# Patient Record
Sex: FEMALE | Race: WHITE | NOT HISPANIC OR LATINO
[De-identification: names, ages, dates, MRNs, and addresses within clinical notes are randomized per-mention and may not be internally consistent; named-entity substitution may affect disease eponyms.]

---

## 2022-03-23 ENCOUNTER — TRANSCRIPTION ENCOUNTER (OUTPATIENT)
Age: 57
End: 2022-03-23

## 2022-06-17 ENCOUNTER — INPATIENT (INPATIENT)
Facility: HOSPITAL | Age: 57
LOS: 3 days | Discharge: HOME | End: 2022-06-21
Attending: INTERNAL MEDICINE | Admitting: INTERNAL MEDICINE
Payer: COMMERCIAL

## 2022-06-17 VITALS
WEIGHT: 250 LBS | HEIGHT: 68 IN | RESPIRATION RATE: 20 BRPM | DIASTOLIC BLOOD PRESSURE: 97 MMHG | SYSTOLIC BLOOD PRESSURE: 147 MMHG | HEART RATE: 108 BPM | OXYGEN SATURATION: 95 %

## 2022-06-17 LAB
ALBUMIN SERPL ELPH-MCNC: 4.5 G/DL — SIGNIFICANT CHANGE UP (ref 3.5–5.2)
ALP SERPL-CCNC: 87 U/L — SIGNIFICANT CHANGE UP (ref 30–115)
ALT FLD-CCNC: 22 U/L — SIGNIFICANT CHANGE UP (ref 0–41)
ANION GAP SERPL CALC-SCNC: 20 MMOL/L — HIGH (ref 7–14)
AST SERPL-CCNC: 28 U/L — SIGNIFICANT CHANGE UP (ref 0–41)
BASOPHILS # BLD AUTO: 0.04 K/UL — SIGNIFICANT CHANGE UP (ref 0–0.2)
BASOPHILS NFR BLD AUTO: 0.3 % — SIGNIFICANT CHANGE UP (ref 0–1)
BILIRUB SERPL-MCNC: 1.3 MG/DL — HIGH (ref 0.2–1.2)
BUN SERPL-MCNC: 10 MG/DL — SIGNIFICANT CHANGE UP (ref 10–20)
CALCIUM SERPL-MCNC: 10 MG/DL — SIGNIFICANT CHANGE UP (ref 8.5–10.1)
CHLORIDE SERPL-SCNC: 96 MMOL/L — LOW (ref 98–110)
CO2 SERPL-SCNC: 18 MMOL/L — SIGNIFICANT CHANGE UP (ref 17–32)
CREAT SERPL-MCNC: 0.8 MG/DL — SIGNIFICANT CHANGE UP (ref 0.7–1.5)
EGFR: 86 ML/MIN/1.73M2 — SIGNIFICANT CHANGE UP
EOSINOPHIL # BLD AUTO: 0.01 K/UL — SIGNIFICANT CHANGE UP (ref 0–0.7)
EOSINOPHIL NFR BLD AUTO: 0.1 % — SIGNIFICANT CHANGE UP (ref 0–8)
GLUCOSE SERPL-MCNC: 147 MG/DL — HIGH (ref 70–99)
HCG SERPL QL: NEGATIVE — SIGNIFICANT CHANGE UP
HCT VFR BLD CALC: 40.4 % — SIGNIFICANT CHANGE UP (ref 37–47)
HGB BLD-MCNC: 13.6 G/DL — SIGNIFICANT CHANGE UP (ref 12–16)
IMM GRANULOCYTES NFR BLD AUTO: 0.7 % — HIGH (ref 0.1–0.3)
LYMPHOCYTES # BLD AUTO: 0.84 K/UL — LOW (ref 1.2–3.4)
LYMPHOCYTES # BLD AUTO: 6.9 % — LOW (ref 20.5–51.1)
MAGNESIUM SERPL-MCNC: 1.3 MG/DL — LOW (ref 1.8–2.4)
MCHC RBC-ENTMCNC: 33.3 PG — HIGH (ref 27–31)
MCHC RBC-ENTMCNC: 33.7 G/DL — SIGNIFICANT CHANGE UP (ref 32–37)
MCV RBC AUTO: 99 FL — SIGNIFICANT CHANGE UP (ref 81–99)
MONOCYTES # BLD AUTO: 0.5 K/UL — SIGNIFICANT CHANGE UP (ref 0.1–0.6)
MONOCYTES NFR BLD AUTO: 4.1 % — SIGNIFICANT CHANGE UP (ref 1.7–9.3)
NEUTROPHILS # BLD AUTO: 10.75 K/UL — HIGH (ref 1.4–6.5)
NEUTROPHILS NFR BLD AUTO: 87.9 % — HIGH (ref 42.2–75.2)
NRBC # BLD: 0 /100 WBCS — SIGNIFICANT CHANGE UP (ref 0–0)
NT-PROBNP SERPL-SCNC: 2340 PG/ML — HIGH (ref 0–300)
PLATELET # BLD AUTO: 261 K/UL — SIGNIFICANT CHANGE UP (ref 130–400)
POTASSIUM SERPL-MCNC: 5 MMOL/L — SIGNIFICANT CHANGE UP (ref 3.5–5)
POTASSIUM SERPL-SCNC: 5 MMOL/L — SIGNIFICANT CHANGE UP (ref 3.5–5)
PROT SERPL-MCNC: 7.2 G/DL — SIGNIFICANT CHANGE UP (ref 6–8)
RBC # BLD: 4.08 M/UL — LOW (ref 4.2–5.4)
RBC # FLD: 14.4 % — SIGNIFICANT CHANGE UP (ref 11.5–14.5)
SARS-COV-2 RNA SPEC QL NAA+PROBE: SIGNIFICANT CHANGE UP
SODIUM SERPL-SCNC: 134 MMOL/L — LOW (ref 135–146)
TROPONIN T SERPL-MCNC: 0.01 NG/ML — SIGNIFICANT CHANGE UP
TROPONIN T SERPL-MCNC: 0.02 NG/ML — HIGH
WBC # BLD: 12.22 K/UL — HIGH (ref 4.8–10.8)
WBC # FLD AUTO: 12.22 K/UL — HIGH (ref 4.8–10.8)

## 2022-06-17 PROCEDURE — 99223 1ST HOSP IP/OBS HIGH 75: CPT

## 2022-06-17 PROCEDURE — 93010 ELECTROCARDIOGRAM REPORT: CPT | Mod: 77

## 2022-06-17 PROCEDURE — 99285 EMERGENCY DEPT VISIT HI MDM: CPT

## 2022-06-17 PROCEDURE — 71046 X-RAY EXAM CHEST 2 VIEWS: CPT | Mod: 26

## 2022-06-17 PROCEDURE — 93010 ELECTROCARDIOGRAM REPORT: CPT

## 2022-06-17 RX ORDER — FUROSEMIDE 40 MG
40 TABLET ORAL
Refills: 0 | Status: DISCONTINUED | OUTPATIENT
Start: 2022-06-17 | End: 2022-06-18

## 2022-06-17 RX ORDER — METOPROLOL TARTRATE 50 MG
5 TABLET ORAL ONCE
Refills: 0 | Status: COMPLETED | OUTPATIENT
Start: 2022-06-17 | End: 2022-06-17

## 2022-06-17 RX ORDER — MAGNESIUM OXIDE 400 MG ORAL TABLET 241.3 MG
400 TABLET ORAL
Refills: 0 | Status: DISCONTINUED | OUTPATIENT
Start: 2022-06-17 | End: 2022-06-21

## 2022-06-17 RX ORDER — FUROSEMIDE 40 MG
40 TABLET ORAL ONCE
Refills: 0 | Status: COMPLETED | OUTPATIENT
Start: 2022-06-17 | End: 2022-06-17

## 2022-06-17 RX ORDER — MAGNESIUM SULFATE 500 MG/ML
2 VIAL (ML) INJECTION ONCE
Refills: 0 | Status: COMPLETED | OUTPATIENT
Start: 2022-06-17 | End: 2022-06-17

## 2022-06-17 RX ORDER — METOPROLOL TARTRATE 50 MG
25 TABLET ORAL
Refills: 0 | Status: DISCONTINUED | OUTPATIENT
Start: 2022-06-17 | End: 2022-06-18

## 2022-06-17 RX ORDER — ENOXAPARIN SODIUM 100 MG/ML
40 INJECTION SUBCUTANEOUS EVERY 24 HOURS
Refills: 0 | Status: DISCONTINUED | OUTPATIENT
Start: 2022-06-17 | End: 2022-06-18

## 2022-06-17 RX ORDER — DILTIAZEM HCL 120 MG
10 CAPSULE, EXT RELEASE 24 HR ORAL ONCE
Refills: 0 | Status: COMPLETED | OUTPATIENT
Start: 2022-06-17 | End: 2022-06-17

## 2022-06-17 RX ORDER — ALBUTEROL 90 UG/1
2 AEROSOL, METERED ORAL EVERY 6 HOURS
Refills: 0 | Status: DISCONTINUED | OUTPATIENT
Start: 2022-06-17 | End: 2022-06-21

## 2022-06-17 RX ORDER — ASPIRIN/CALCIUM CARB/MAGNESIUM 324 MG
162 TABLET ORAL ONCE
Refills: 0 | Status: COMPLETED | OUTPATIENT
Start: 2022-06-17 | End: 2022-06-17

## 2022-06-17 RX ORDER — DILTIAZEM HCL 120 MG
10 CAPSULE, EXT RELEASE 24 HR ORAL
Qty: 125 | Refills: 0 | Status: DISCONTINUED | OUTPATIENT
Start: 2022-06-17 | End: 2022-06-18

## 2022-06-17 RX ADMIN — Medication 10 MILLIGRAM(S): at 23:33

## 2022-06-17 RX ADMIN — Medication 25 GRAM(S): at 13:11

## 2022-06-17 RX ADMIN — Medication 25 MILLIGRAM(S): at 18:34

## 2022-06-17 RX ADMIN — MAGNESIUM OXIDE 400 MG ORAL TABLET 400 MILLIGRAM(S): 241.3 TABLET ORAL at 21:31

## 2022-06-17 RX ADMIN — Medication 40 MILLIGRAM(S): at 11:56

## 2022-06-17 RX ADMIN — Medication 5 MILLIGRAM(S): at 23:16

## 2022-06-17 RX ADMIN — Medication 40 MILLIGRAM(S): at 20:24

## 2022-06-17 RX ADMIN — Medication 162 MILLIGRAM(S): at 11:56

## 2022-06-17 RX ADMIN — Medication 5 MILLIGRAM(S): at 23:33

## 2022-06-17 NOTE — ED ADULT NURSE NOTE - CHIEF COMPLAINT QUOTE
Pt here with sob, chest pressure and BLLE swelling since arrival on Sunday from [facility]. ekg done.

## 2022-06-17 NOTE — ED PROVIDER NOTE - PHYSICAL EXAMINATION
VITAL SIGNS: I have reviewed nursing notes and confirm.  CONSTITUTIONAL: Well-developed; well-nourished; in no acute distress.   SKIN: skin exam is warm and dry, no acute rash.    HEAD: Normocephalic; atraumatic.  EYES: conjunctiva and sclera clear.  ENT: No nasal discharge; airway clear.  NECK: Supple; non tender.  CARD: S1, S2 normal; no murmurs, gallops, or rubs. Regular rate and rhythm.   RESP: No wheezes, rales or rhonchi.  ABD: Normal bowel sounds; soft; non-distended; non-tender  EXT: pitting edema lower extremity, Normal ROM.  No clubbing, cyanosis .   LYMPH: No acute cervical adenopathy.  NEURO: Alert, oriented, grossly unremarkable  PSYCH: Cooperative, appropriate.

## 2022-06-17 NOTE — H&P ADULT - ATTENDING COMMENTS
Patient seen and examined on 06/17    HPI:  55 y/o woman with a past medical history of paroxysmal atrial fibrillation (not on AC) admitted for fluid overload. She notes progressive SOB/BASSETT, orthopnea, and LE swelling, associated with palpitations for the last week. Prior to this she was on a cruise and reports eating poorly/salty foods. She denied any chest pain, abdo pain, n/v/d, LE pain/erythema.   She reports a remote history of pA-Fib for which she takes metoprolol and ASA but not on anticoagulation. Says she hasn't followed with Divuuri in about 7 years because she felt fine afterwards  OF NOTE: patient was noted to be in a-fib w/RVR on Tele, 135 - 160. She reports palpitations but otherwise breathing well and no chest pain.   Metoprolol 5 x 2 IV given without improvement. Cardizem 10mg IV given with rate improvement to ~105-128 and alleviation of the palpitations.  She was given lasix earlier and reported significant improvement in chest pain, dyspnea and LE edema       REVIEW OF SYSTEMS:  CONSTITUTIONAL:  No weakness, fevers, chills, night sweats, weight loss  EYES/ENT: No visual changes. No vertigo or dysphagia  NECK: No neck pain or stiffness  RESPIRATORY: No cough, wheezing, hemoptysis. + shortness of breath  CARDIOVASCULAR: No chest pain, + palpitations. +lower extremity edema, orthopnea   GASTROINTESTINAL: No abdominal pain. No nausea, vomiting, diarrhea, or hematemesis  GENITOURINARY: No dysuria or hematuria   NEUROLOGICAL: No focal numbness or weakness  SKIN: No rashes or itching  HEMATOLOGIC: No easy bruising or prolonged bleeding.      PHYSICAL EXAM:  GENERAL: NAD, obese, Non-toxic, stated age   HEAD:  Atraumatic, Normocephalic  EYES: EOMI, Sclera White   NECK: Supple, No JVD  CHEST/LUNG: mild crackles bilaterally; No wheezing, rhonchi. Breathing and speaking comfortably on RA   HEART: tachycardic, irregular rate and rhythm; s1, s2, No murmurs, rubs, or gallops  ABDOMEN: Soft, Nontender, Nondistended; Bowel sounds present, No rebound or guarding noted   EXTREMITIES:  trace pitting lower extremity edema. No calf tenderness to palpation.  No clubbing or cyanosis  PSYCH: AAOx3, pleasant, cooperative, not anxious  NEUROLOGY: 5/5 strength in all extremities, no downward drift. Sensation grossly intact.   SKIN: No rashes or lesions      ASSESSMENT AND PLAN:  Fluid overload / Suspected acute heart failure with unknown ejection fraction   Heart failure with reduced ejection fraction exacerbation:   -Cont with IV lasix 40mg qD  -Cont tele monitoring, trend cardiac enzymes, check 2D Echo.   -Cont beta blocker. Add ACE-I if BP tolerates, currently on cardizem as well for rate control   -Strict I/O, daily weights, and monitor electrolytes carefully while aggressively diuresing.   -Cardio eval with Dr Nascimento / Reva      -Saw Divuuri 7 years ago, wanted a new cardiologist     Paroxysmal atrial fibrillation with RVR  Elevated trops --> down trended   Hypomagnesemia --> replete and repeat in the morning   -Metorpolol IV 5 x 2 without improvement  -Responded but not fully controlled with cardizem IV 10, cardizem drip started for now     -Improved symptoms, HR still ~115    -My need further antiarrhythmics if Euvolemic, mag normal, and rate still uncontrolled   -Cont with metoprolol for now, can increase once volume status improved   -CHADVASC 2-3 (assuming CHF for now, and pressures slightly elevated) --> start therapeutic lovenox for now  -Cardio eval   -EPS eval if requested by cardio     Hyperglycemia: check Hba1c     Obesity: Patient should follow with an out patient dietician. Bariatric Surgery should be discussed with her PMD.     DVT ppx: therapeutic lovenox  GI ppx: Not indicated  GOC: Full code.    My note supersedes the residents in the event of a discrepancy.

## 2022-06-17 NOTE — ED PROVIDER NOTE - OBJECTIVE STATEMENT
Patient is a 56-year-old female with past medical history of tachycardia on metoprolol here for evaluation of dyspnea on exertion x2 weeks has been progressively worsening with associated midsternal chest discomfort and symmetrical lower extremity edema.  Patient also notes that she feels short of breath when lying flat.  Patient denies fever, chills, cough, abdominal pain.  Patient's last cardiac work-up was approximately 7 years ago.

## 2022-06-17 NOTE — H&P ADULT - NSHPPHYSICALEXAM_GEN_ALL_CORE
(This exam performed after pt responded well to diuresis in the ED):    CONSTITUTIONAL: No acute distress, well-developed, well-groomed, AAOx3  HEAD: Atraumatic, normocephalic  EYES: EOM intact, PERRLA, conjunctiva and sclera clear  ENT: Supple, no masses, no thyromegaly, no bruits, no JVD; moist mucous membranes  PULMONARY: crackles at lung bases b/l, symmetric chest rise, no tenderness on palpation  CARDIOVASCULAR: Regular rate and rhythm; no murmurs, rubs, or gallops  GASTROINTESTINAL: Soft, non-tender, non-distended; bowel sounds present  MUSCULOSKELETAL: 2+ peripheral pulses; no cyanosis. +b/l lower extremity edema to distal knees  SKIN: No rashes or lesions; warm and dry

## 2022-06-17 NOTE — H&P ADULT - ASSESSMENT
#CHF exacerbation, acute, ejection fraction unknown  - s/p diuresis in ED with improvement in dyspnea  - c/w IV diuresis for now  - med-tele monitoring, strict ins/outs, 1.5L fluid restriction  - f/u TTE echo        #Mild troponemia with atypical chest pain (resolved  - EKG no acute ischemic changes  - f/u 3rd trop at 4pm    #b/l lower extremity edema  - etiology most likely CHF exasp but given recent international travel, obesity cannot rule out DVT  - LE duplex ordered--> f/u  - prophylactic Lovenox for now    #Obesity  - weight loss and exercise counselling  - DASH/ CC diet    #Code status: full code    note patient will need strict medication adherence counselling and o/p cardiology follow up to prevent readmission, she also does not have a PMD #CHF exacerbation, acute, ejection fraction unknown  - s/p diuresis in ED with improvement in dyspnea  - c/w IV diuresis for now  - med-tele monitoring, strict ins/outs, 1.5L fluid restriction  - f/u TTE echo      #Mild troponemia with atypical chest pain (resolved  - EKG no acute ischemic changes  - f/u 3rd trop at 4pm      #b/l lower extremity edema  - etiology most likely CHF exasp but given recent international travel, obesity cannot rule out DVT  - LE duplex ordered--> f/u  - prophylactic Lovenox for now    #HTN  - c/w home metoprolol ER 25mg    #COPD  - not on home O2 or maintenance meds  -no wheezing on exam  - c/w albuterol prn    #Obesity  - weight loss and exercise counselling  - DASH/ CC diet      #Code status: full code    note patient will need strict medication adherence counselling and o/p cardiology follow up to prevent readmission, PMD is Dr Zavala on [facility] #CHF exacerbation, acute, ejection fraction unknown  - s/p diuresis in ED with improvement in dyspnea  - c/w IV diuresis for now  - med-tele monitoring, strict ins/outs, 1.5L fluid restriction  - f/u TTE echo      #Mild troponemia with atypical chest pain (resolved  - EKG no acute ischemic changes  - f/u 3rd trop at 4pm      #b/l lower extremity edema  - etiology most likely CHF exasp but given recent international travel, obesity cannot rule out DVT  - LE duplex ordered--> f/u  - prophylactic Lovenox for now    #HTN  - c/w home metoprolol ER 25mg    #COPD  - not on home O2 or maintenance meds  -no wheezing on exam  - c/w albuterol prn    #Obesity  - weight loss and exercise counselling  - DASH/ CC diet    #Hypomagnesemia on admission  - Repleted in ED  - f/u am level    #Code status: full code    note patient will need strict medication adherence counselling and o/p cardiology follow up to prevent readmission, PMD is Dr Zavala on [facility] #CHF exacerbation, acute, ejection fraction unknown  - s/p diuresis in ED with improvement in dyspnea  - c/w IV diuresis for now  - med-tele monitoring, strict ins/outs, 1.5L fluid restriction  - f/u TTE echo      #Mild troponemia with atypical chest pain (resolved  - EKG no acute ischemic changes  - f/u 3rd trop at 4pm      #b/l lower extremity edema  - etiology most likely CHF exasp but given recent international travel, obesity cannot rule out DVT  - LE duplex ordered--> f/u  - prophylactic Lovenox for now    #HTN  - c/w home metoprolol ER 25mg    #Obesity  - weight loss and exercise counselling  - DASH/ CC diet    #Hypomagnesemia on admission  - Repleted in ED  - f/u am level    #Code status: full code    note patient will need strict medication adherence counselling and o/p cardiology follow up to prevent readmission, PMD is Dr Zavala on [facility]

## 2022-06-17 NOTE — ED PROVIDER NOTE - CLINICAL SUMMARY MEDICAL DECISION MAKING FREE TEXT BOX
56-year-old female presenting with shortness of breath dyspnea on exertion orthopnea bilateral lower extremity swelling, intermittent for several weeks, acutely worse in the past week.  States that she has not seen cardiology in over 5 years.  Last test 7 years ago with Dr. Rodriguez.  Previously had associated chest discomfort, now resolved.  Well appearing, NAD, non toxic. NCAT PERRLA EOMI neck supple non tender normal wob diffuse crackles, diminished breath sounds bilateral bases rrr abdomen s nt nd no rebound no guarding [id number] neuro non focal 2+ pitting edema to bilateral lower extremities.  Labs EKG imaging reviewed.  Patient diuresed.  Will admit for further evaluation.

## 2022-06-17 NOTE — H&P ADULT - NSHPLABSRESULTS_GEN_ALL_CORE
SUBJECTIVE:    Patient is a 56y old Female who presents with a chief complaint of     HPI:      Currently admitted to medicine with the primary diagnosis of CHF exacerbation         Besides the pertinent positives and negatives described above, the ROS was within normal limits.    PAST MEDICAL & SURGICAL HISTORY  No pertinent past medical history    No significant past surgical history      SOCIAL HISTORY:    ALLERGIES:  No Known Allergies    MEDICATIONS:  STANDING MEDICATIONS    PRN MEDICATIONS    VITALS:   T(F): --  HR: 88  BP: 138/78  RR: 18  SpO2: 98%    LABS:                        13.6   12.22 )-----------( 261      ( 17 Jun 2022 10:19 )             40.4     06-17    134<L>  |  96<L>  |  10  ----------------------------<  147<H>  5.0   |  18  |  0.8    Ca    10.0      17 Jun 2022 10:19  Mg     1.3     06-17    TPro  7.2  /  Alb  4.5  /  TBili  1.3<H>  /  DBili  x   /  AST  28  /  ALT  22  /  AlkPhos  87  06-17          Troponin T, Serum: 0.02 ng/mL *H* (06-17-22 @ 10:19)      CARDIAC MARKERS ( 17 Jun 2022 10:19 )  x     / 0.02 ng/mL / x     / x     / x          RADIOLOGY:< from: Xray Chest 2 Views PA/Lat (06.17.22 @ 10:24) >      INTERPRETATION:  Clinical History / Reason for exam: Chest pain.    Comparison : Chest radiograph July 15, 2013.    Technique/Positioning: Frontaland lateral.    Impression:    Bilateral opacifications. Cardiomegaly    < from: 12 Lead ECG (06.17.22 @ 09:30) >      Diagnosis Line Sinus tachycardia with 1st degree A-V block with occasional Premature  ventricular complexes  Rightward axis  Low voltage QRS  Cannot rule out Anteroseptal infarct , age undetermined  Abnormal ECG      < end of copied text >

## 2022-06-17 NOTE — ED ADULT TRIAGE NOTE - CHIEF COMPLAINT QUOTE
Pt here with sob, chest pressure and BLLE swelling since arrival on Sunday from [facility]. ekg done.

## 2022-06-17 NOTE — ED ADULT NURSE NOTE - OBJECTIVE STATEMENT
Pt presented to ED c/o chest pain. Pt c/o chest pain, SOB on exertion, and b/l LE edema. Denies n/v/d/fevers/chills. (+)pulses noted. Steady gait noted. Pt A&Ox4, ambulatory baseline. Pt placed on Tele/O2 monitor.

## 2022-06-17 NOTE — H&P ADULT - HISTORY OF PRESENT ILLNESS
56-year-old female no past medical history (does not follow with any doctors) presenting with shortness of breath that has been gradually worsening over the last few weeks, two the point where she now becomes out of breath after taking only a few steps. Also endorses frequent nighttime awakenings with need to prop her head up on pillows during this time frame. Also endorses lower extremity swelling. She is not aware of any heart conditions but admits she once saw a cardiologist. Dr Kingston 7 years ago. This morning she also noticed mild vague chest pain, sharp and intermittent, so decided to come to the ED but pain resolved by the time she presented and did not reoccur.     In the ED pt was well appearing,saturating well on room air non-labored breathing at rest but CXR showed diffuse b/l infiltrates to mid/upper lung BNP 2.3K Trops mildly elevated x2 but no ischemic changes on EKG. PT was diuresed in ED and reported good symptom relief thereafter, will be admitted to [facility] for continued treatment and echocardiogram workup of acute heart failure exacerbation

## 2022-06-17 NOTE — ED PROVIDER NOTE - NS ED ROS FT
Eyes:  No visual changes, eye pain or discharge.  ENMT:  No hearing changes, pain, discharge or infections. No neck pain or stiffness.  Cardiac:  + chest pain, SOB and edema. + chest pain with exertion.  Respiratory:  No cough or respiratory distress. No hemoptysis. No history of asthma or RAD.  GI:  No nausea, vomiting, diarrhea or abdominal pain.  :  No dysuria, frequency or burning.  MS:  No myalgia, muscle weakness, joint pain or back pain.  Neuro:  No headache or weakness.  No LOC.  Skin:  No skin rash.   Endocrine: No history of thyroid disease or diabetes.  Except as documented in the HPI,  all other systems are negative.

## 2022-06-17 NOTE — ED PROVIDER NOTE - NS ED ATTENDING STATEMENT MOD
This was a shared visit with the KATARZYNA. I reviewed and verified the documentation and independently performed the documented:

## 2022-06-18 LAB
ALBUMIN SERPL ELPH-MCNC: 4.3 G/DL — SIGNIFICANT CHANGE UP (ref 3.5–5.2)
ALBUMIN SERPL ELPH-MCNC: 4.5 G/DL — SIGNIFICANT CHANGE UP (ref 3.5–5.2)
ALP SERPL-CCNC: 70 U/L — SIGNIFICANT CHANGE UP (ref 30–115)
ALP SERPL-CCNC: 77 U/L — SIGNIFICANT CHANGE UP (ref 30–115)
ALT FLD-CCNC: 22 U/L — SIGNIFICANT CHANGE UP (ref 0–41)
ALT FLD-CCNC: 23 U/L — SIGNIFICANT CHANGE UP (ref 0–41)
ANION GAP SERPL CALC-SCNC: 16 MMOL/L — HIGH (ref 7–14)
ANION GAP SERPL CALC-SCNC: 18 MMOL/L — HIGH (ref 7–14)
AST SERPL-CCNC: 30 U/L — SIGNIFICANT CHANGE UP (ref 0–41)
AST SERPL-CCNC: 33 U/L — SIGNIFICANT CHANGE UP (ref 0–41)
BASOPHILS # BLD AUTO: 0.06 K/UL — SIGNIFICANT CHANGE UP (ref 0–0.2)
BASOPHILS NFR BLD AUTO: 0.8 % — SIGNIFICANT CHANGE UP (ref 0–1)
BILIRUB SERPL-MCNC: 1.2 MG/DL — SIGNIFICANT CHANGE UP (ref 0.2–1.2)
BILIRUB SERPL-MCNC: 1.2 MG/DL — SIGNIFICANT CHANGE UP (ref 0.2–1.2)
BUN SERPL-MCNC: 12 MG/DL — SIGNIFICANT CHANGE UP (ref 10–20)
BUN SERPL-MCNC: 13 MG/DL — SIGNIFICANT CHANGE UP (ref 10–20)
CALCIUM SERPL-MCNC: 9.4 MG/DL — SIGNIFICANT CHANGE UP (ref 8.5–10.1)
CALCIUM SERPL-MCNC: 9.5 MG/DL — SIGNIFICANT CHANGE UP (ref 8.5–10.1)
CHLORIDE SERPL-SCNC: 94 MMOL/L — LOW (ref 98–110)
CHLORIDE SERPL-SCNC: 95 MMOL/L — LOW (ref 98–110)
CHOLEST SERPL-MCNC: 201 MG/DL — HIGH
CO2 SERPL-SCNC: 23 MMOL/L — SIGNIFICANT CHANGE UP (ref 17–32)
CO2 SERPL-SCNC: 27 MMOL/L — SIGNIFICANT CHANGE UP (ref 17–32)
CREAT SERPL-MCNC: 0.8 MG/DL — SIGNIFICANT CHANGE UP (ref 0.7–1.5)
CREAT SERPL-MCNC: 1 MG/DL — SIGNIFICANT CHANGE UP (ref 0.7–1.5)
EGFR: 66 ML/MIN/1.73M2 — SIGNIFICANT CHANGE UP
EGFR: 86 ML/MIN/1.73M2 — SIGNIFICANT CHANGE UP
EOSINOPHIL # BLD AUTO: 0.06 K/UL — SIGNIFICANT CHANGE UP (ref 0–0.7)
EOSINOPHIL NFR BLD AUTO: 0.8 % — SIGNIFICANT CHANGE UP (ref 0–8)
GLUCOSE SERPL-MCNC: 148 MG/DL — HIGH (ref 70–99)
GLUCOSE SERPL-MCNC: 177 MG/DL — HIGH (ref 70–99)
HCT VFR BLD CALC: 40.1 % — SIGNIFICANT CHANGE UP (ref 37–47)
HDLC SERPL-MCNC: 38 MG/DL — LOW
HGB BLD-MCNC: 13.3 G/DL — SIGNIFICANT CHANGE UP (ref 12–16)
IMM GRANULOCYTES NFR BLD AUTO: 0.5 % — HIGH (ref 0.1–0.3)
LIPID PNL WITH DIRECT LDL SERPL: 141 MG/DL — HIGH
LYMPHOCYTES # BLD AUTO: 1.24 K/UL — SIGNIFICANT CHANGE UP (ref 1.2–3.4)
LYMPHOCYTES # BLD AUTO: 15.6 % — LOW (ref 20.5–51.1)
MAGNESIUM SERPL-MCNC: 1.4 MG/DL — LOW (ref 1.8–2.4)
MAGNESIUM SERPL-MCNC: 1.7 MG/DL — LOW (ref 1.8–2.4)
MCHC RBC-ENTMCNC: 33.2 G/DL — SIGNIFICANT CHANGE UP (ref 32–37)
MCHC RBC-ENTMCNC: 33.3 PG — HIGH (ref 27–31)
MCV RBC AUTO: 100.5 FL — HIGH (ref 81–99)
MONOCYTES # BLD AUTO: 0.49 K/UL — SIGNIFICANT CHANGE UP (ref 0.1–0.6)
MONOCYTES NFR BLD AUTO: 6.2 % — SIGNIFICANT CHANGE UP (ref 1.7–9.3)
NEUTROPHILS # BLD AUTO: 6.06 K/UL — SIGNIFICANT CHANGE UP (ref 1.4–6.5)
NEUTROPHILS NFR BLD AUTO: 76.1 % — HIGH (ref 42.2–75.2)
NON HDL CHOLESTEROL: 163 MG/DL — HIGH
NRBC # BLD: 0 /100 WBCS — SIGNIFICANT CHANGE UP (ref 0–0)
PLATELET # BLD AUTO: 194 K/UL — SIGNIFICANT CHANGE UP (ref 130–400)
POTASSIUM SERPL-MCNC: 3.6 MMOL/L — SIGNIFICANT CHANGE UP (ref 3.5–5)
POTASSIUM SERPL-MCNC: 3.9 MMOL/L — SIGNIFICANT CHANGE UP (ref 3.5–5)
POTASSIUM SERPL-SCNC: 3.6 MMOL/L — SIGNIFICANT CHANGE UP (ref 3.5–5)
POTASSIUM SERPL-SCNC: 3.9 MMOL/L — SIGNIFICANT CHANGE UP (ref 3.5–5)
PROT SERPL-MCNC: 6.6 G/DL — SIGNIFICANT CHANGE UP (ref 6–8)
PROT SERPL-MCNC: 6.8 G/DL — SIGNIFICANT CHANGE UP (ref 6–8)
RBC # BLD: 3.99 M/UL — LOW (ref 4.2–5.4)
RBC # FLD: 14.8 % — HIGH (ref 11.5–14.5)
SODIUM SERPL-SCNC: 135 MMOL/L — SIGNIFICANT CHANGE UP (ref 135–146)
SODIUM SERPL-SCNC: 138 MMOL/L — SIGNIFICANT CHANGE UP (ref 135–146)
TRIGL SERPL-MCNC: 115 MG/DL — SIGNIFICANT CHANGE UP
TROPONIN T SERPL-MCNC: <0.01 NG/ML — SIGNIFICANT CHANGE UP
WBC # BLD: 7.95 K/UL — SIGNIFICANT CHANGE UP (ref 4.8–10.8)
WBC # FLD AUTO: 7.95 K/UL — SIGNIFICANT CHANGE UP (ref 4.8–10.8)

## 2022-06-18 PROCEDURE — 93010 ELECTROCARDIOGRAM REPORT: CPT

## 2022-06-18 PROCEDURE — 99233 SBSQ HOSP IP/OBS HIGH 50: CPT

## 2022-06-18 RX ORDER — METOPROLOL TARTRATE 50 MG
25 TABLET ORAL
Refills: 0 | Status: DISCONTINUED | OUTPATIENT
Start: 2022-06-18 | End: 2022-06-20

## 2022-06-18 RX ORDER — ENOXAPARIN SODIUM 100 MG/ML
115 INJECTION SUBCUTANEOUS EVERY 12 HOURS
Refills: 0 | Status: DISCONTINUED | OUTPATIENT
Start: 2022-06-18 | End: 2022-06-21

## 2022-06-18 RX ORDER — POTASSIUM CHLORIDE 20 MEQ
40 PACKET (EA) ORAL ONCE
Refills: 0 | Status: COMPLETED | OUTPATIENT
Start: 2022-06-18 | End: 2022-06-18

## 2022-06-18 RX ORDER — MAGNESIUM SULFATE 500 MG/ML
2 VIAL (ML) INJECTION ONCE
Refills: 0 | Status: COMPLETED | OUTPATIENT
Start: 2022-06-18 | End: 2022-06-18

## 2022-06-18 RX ORDER — DILTIAZEM HCL 120 MG
10 CAPSULE, EXT RELEASE 24 HR ORAL
Qty: 125 | Refills: 0 | Status: DISCONTINUED | OUTPATIENT
Start: 2022-06-18 | End: 2022-06-18

## 2022-06-18 RX ORDER — FUROSEMIDE 40 MG
40 TABLET ORAL
Refills: 0 | Status: DISCONTINUED | OUTPATIENT
Start: 2022-06-18 | End: 2022-06-21

## 2022-06-18 RX ORDER — DILTIAZEM HCL 120 MG
60 CAPSULE, EXT RELEASE 24 HR ORAL EVERY 6 HOURS
Refills: 0 | Status: DISCONTINUED | OUTPATIENT
Start: 2022-06-18 | End: 2022-06-20

## 2022-06-18 RX ADMIN — Medication 25 MILLIGRAM(S): at 05:58

## 2022-06-18 RX ADMIN — Medication 40 MILLIGRAM(S): at 14:41

## 2022-06-18 RX ADMIN — ENOXAPARIN SODIUM 40 MILLIGRAM(S): 100 INJECTION SUBCUTANEOUS at 00:40

## 2022-06-18 RX ADMIN — Medication 5 MG/HR: at 00:08

## 2022-06-18 RX ADMIN — Medication 40 MILLIEQUIVALENT(S): at 03:43

## 2022-06-18 RX ADMIN — MAGNESIUM OXIDE 400 MG ORAL TABLET 400 MILLIGRAM(S): 241.3 TABLET ORAL at 11:33

## 2022-06-18 RX ADMIN — ENOXAPARIN SODIUM 115 MILLIGRAM(S): 100 INJECTION SUBCUTANEOUS at 05:58

## 2022-06-18 RX ADMIN — Medication 25 MILLIGRAM(S): at 17:53

## 2022-06-18 RX ADMIN — Medication 10 MG/HR: at 08:52

## 2022-06-18 RX ADMIN — Medication 25 GRAM(S): at 03:44

## 2022-06-18 RX ADMIN — MAGNESIUM OXIDE 400 MG ORAL TABLET 400 MILLIGRAM(S): 241.3 TABLET ORAL at 17:54

## 2022-06-18 RX ADMIN — Medication 40 MILLIGRAM(S): at 05:58

## 2022-06-18 RX ADMIN — Medication 30 MILLIGRAM(S): at 17:54

## 2022-06-18 RX ADMIN — Medication 60 MILLIGRAM(S): at 11:32

## 2022-06-18 RX ADMIN — MAGNESIUM OXIDE 400 MG ORAL TABLET 400 MILLIGRAM(S): 241.3 TABLET ORAL at 08:52

## 2022-06-18 RX ADMIN — ENOXAPARIN SODIUM 115 MILLIGRAM(S): 100 INJECTION SUBCUTANEOUS at 17:55

## 2022-06-18 NOTE — PROGRESS NOTE ADULT - ASSESSMENT
#CHF exacerbation, acute, suspected HFpEF likely 2/2 Afib   #afib with RVR  - s/p diuresis in ED with improvement in dyspnea  - DC intravenous lasix, switch to po 40 mg daily   - cardiology appreciated  - med-tele monitoring, strict ins/outs, 1.5L fluid restriction  - f/u TTE echo  - trops 0.02-->0.01  - vtach on cardiac telemonitoring   - dc dilt ggt and start Cardizem 60 mg q6h  - continue cardiac telemonitoring  - will need DIANNE on monday  - lovenox 115 mg BID   - cardiology spoke to pt regarding bleeding risks as pt will need eliquis on DC   - Discussed benefits and risks of starting anticoagulation to prevent strokes from Afib including risk of excessively bleeding gums or nose bleeds, hematuria,  hemorrhagic stroke, GI bleed,  excessive bleeding after trauma or cuts and even death. Advised seek medical intervention immediately. Pt decided to start anticoagulation given benefits outweighs risk.      #Mild troponemia with atypical chest pain (resolved)  - EKG no acute ischemic changes  - stable likely ischemic demand from tachycardia       #HTN  - c/w home metoprolol ER 25mg    #Obesity  - weight loss and exercise counselling  - DASH/ CC diet    #Hypomagnesemia on admission  - Repleted in ED  - f/u am level    #Progress Note Handoff  Pending (specify):  DIANNE on Monday, follow up cardiology   Family discussion: house staff updated pt family  Disposition: home  Anticipated date: 6/20

## 2022-06-18 NOTE — CONSULT NOTE ADULT - ASSESSMENT
57 yo femal with pafib now in afib with rapid v response probably for 1 month p tfeels much better with hr control and lasix.  pt had 1 borderline troponin in setting of rapid v response  change to po cardiazem  echo f/u ef  tsh  pt for corey and d/c cv on Monday if does not convert to nsr prior.    discussed with staff.

## 2022-06-18 NOTE — CONSULT NOTE ADULT - SUBJECTIVE AND OBJECTIVE BOX
Patient is a 56y old  Female who presents with a chief complaint of afib    HPI:  56-year-old female with hx of pafib 7 yrs ago, presenting with shortness of breath that has been gradually worsening over the last few weeks, to the point where she now becomes out of breath after taking only a few steps. Also endorses frequent nighttime awakenings with need to prop her head up on pillows during this time frame. Also endorses lower extremity swelling. She has palpitations since her initial episode of afib several times but noticed she has remained in afib for the past month.  pt also noticed mild vague chest pain, sharp and intermittent, so decided to come to the ED but pain resolved by the time she presented and did not reoccur.     In the ED pt was well appearing, saturating well on room air non-labored breathing at rest but CXR showed diffuse b/l infiltrates to mid/upper lung BNP 2.3K Trops mildly elevated x1 but no ischemic changes on EKG. pt had at least 13 beat wide complex irregulaur rhtyhm on tele that may be afib with aberrancy  PT was diuresed in ED and reported good symptom relief thereafter, will be admitted to [facility] for continued treatment and echocardiogram workup of acute heart failure exacerbation   (17 Jun 2022 15:04)      PAST MEDICAL & SURGICAL HISTORY:  No pertinent past medical history      No significant past surgical history          PREVIOUS DIAGNOSTIC TESTING:      ECHO  FINDINGS:    STRESS TEST  FINDINGS:    CATHETERIZATION  FINDINGS:    MEDICATIONS  (STANDING):  diltiazem    Tablet 60 milliGRAM(s) Oral every 6 hours  enoxaparin Injectable 115 milliGRAM(s) SubCutaneous every 12 hours  furosemide    Tablet 40 milliGRAM(s) Oral two times a day  magnesium oxide 400 milliGRAM(s) Oral three times a day with meals  metoprolol tartrate 25 milliGRAM(s) Oral two times a day    MEDICATIONS  (PRN):  ALBUTerol    90 MICROgram(s) HFA Inhaler 2 Puff(s) Inhalation every 6 hours PRN Wheezing      FAMILY HISTORY:  No pertinent family history in first degree relatives        SOCIAL HISTORY:  CIGARETTES:  ALCOHOL:  DRUGS:                      REVIEW OF SYSTEMS:  CONSTITUTIONAL: No distress, Looks stable  NECK: No pain or stiffness  RESPIRATORY: No cough, wheezing,(+) shortness of breath  CARDIOVASCULAR: No chest pain, SOB, palpitations,(+) leg swelling  GASTROINTESTINAL: No abdominal or epigastric pain. No nausea, vomiting, or hematemesis;  No melena.  NEUROLOGICAL: No dizziness, headaches, memory loss, loss of strength  SKIN: No itching, burning, rashes, or lesions   ENDOCRINE: No heat or cold intolerance  MUSCULOSKELETAL: No joint pain, No  swelling; No muscle pain  PSYCHIATRIC: No depression, anxiety, mood swings, or difficulty sleeping  ALLERGY: No hives, itching, rash          Vital Signs Last 24 Hrs  T(C): 36.5 (18 Jun 2022 08:02), Max: 36.9 (17 Jun 2022 15:32)  T(F): 97.7 (18 Jun 2022 08:02), Max: 98.5 (18 Jun 2022 00:09)  HR: 76 (18 Jun 2022 08:02) (44 - 125)  BP: 106/65 (18 Jun 2022 08:02) (106/65 - 149/79)  BP(mean): --  RR: 18 (18 Jun 2022 08:02) (18 - 18)  SpO2: 96% (18 Jun 2022 08:02) (94% - 97%)                      PHYSICAL EXAM:  GENERAL: No distress, well developed  HEAD:  Atraumatic, Normocephalic  NECK: Supple, No JVD, No Bruit of either carotid arteries  NERVOUS SYSTEM:  Alert, Awake, Oriented to time, place, person; Normal memory and speech; Normal motor Strength 5/5 B/L upper and lower extremities  CHEST/LUNG: Normal air entry to lung base bilaterally; No wheeze, crackle, rales, rhonchi  HEART:RRegular heart beat, S1, A2, P2, No S3, No S4, No gallop, No murmur  ABDOMEN: Soft, Non tender, Non distended; Bowel sounds present  EXTREMITIES:  2+ Peripheral Pulses, No clubbing, (+) edema but greatly improved with lasix  SKIN: No rashes or lesions    TELEMETRY:  afib mod v response,  irregular wide complex rhythm.   ECG:    I&O's Detail      LABS:                        13.3   7.95  )-----------( 194      ( 18 Jun 2022 10:15 )             40.1     06-18    135  |  94<L>  |  13  ----------------------------<  177<H>  3.9   |  23  |  1.0    Ca    9.4      18 Jun 2022 10:15  Mg     1.7     06-18    TPro  6.6  /  Alb  4.3  /  TBili  1.2  /  DBili  x   /  AST  33  /  ALT  22  /  AlkPhos  70  06-18    CARDIAC MARKERS ( 18 Jun 2022 11:36 )  x     / <0.01 ng/mL / x     / x     / x      CARDIAC MARKERS ( 17 Jun 2022 16:03 )  x     / 0.01 ng/mL / x     / x     / x      CARDIAC MARKERS ( 17 Jun 2022 10:19 )  x     / 0.02 ng/mL / x     / x     / x              I&O's Summary      RADIOLOGY & ADDITIONAL STUDIES:

## 2022-06-18 NOTE — PROGRESS NOTE ADULT - SUBJECTIVE AND OBJECTIVE BOX
Pt seen and examined at bedside. No CP or SOB. Pt feeling better.      PAST MEDICAL & SURGICAL HISTORY:  No pertinent past medical history  No significant past surgical history    VITAL SIGNS (Last 24 hrs):  T(C): 36.5 (06-18-22 @ 08:02), Max: 36.9 (06-17-22 @ 15:32)  HR: 76 (06-18-22 @ 08:02) (44 - 125)  BP: 106/65 (06-18-22 @ 08:02) (106/65 - 149/79)  RR: 18 (06-18-22 @ 08:02) (18 - 18)  SpO2: 96% (06-18-22 @ 08:02) (94% - 97%)  Wt(kg): --  Daily     Daily     I&O's Summary      PHYSICAL EXAM:  GENERAL: NAD, well-developed  HEAD:  Atraumatic, Normocephalic  EYES: EOMI, PERRLA, conjunctiva and sclera clear  NECK: Supple, No JVD  CHEST/LUNG: Clear to auscultation bilaterally; No wheeze  HEART: Regular rate and rhythm; No murmurs, rubs, or gallops  ABDOMEN: Soft, Nontender, Nondistended; Bowel sounds present  EXTREMITIES:  2+ Peripheral Pulses, No clubbing, cyanosis, or edema  PSYCH: AAOx3  NEUROLOGY: non-focal  SKIN: No rashes or lesions    Labs Reviewed  Spoke to patient in regards to abnormal labs.    CBC Full  -  ( 18 Jun 2022 10:15 )  WBC Count : 7.95 K/uL  Hemoglobin : 13.3 g/dL  Hematocrit : 40.1 %  Platelet Count - Automated : 194 K/uL  Mean Cell Volume : 100.5 fL  Mean Cell Hemoglobin : 33.3 pg  Mean Cell Hemoglobin Concentration : 33.2 g/dL  Auto Neutrophil # : 6.06 K/uL  Auto Lymphocyte # : 1.24 K/uL  Auto Monocyte # : 0.49 K/uL  Auto Eosinophil # : 0.06 K/uL  Auto Basophil # : 0.06 K/uL  Auto Neutrophil % : 76.1 %  Auto Lymphocyte % : 15.6 %  Auto Monocyte % : 6.2 %  Auto Eosinophil % : 0.8 %  Auto Basophil % : 0.8 %    BMP:    06-18 @ 10:15    Blood Urea Nitrogen - 13  Calcium - 9.4  Carbond Dioxide - 23  Chloride - 94  Creatinine - 1.0  Glucose - 177  Potassium - 3.9  Sodium - 135      Hemoglobin A1c -     Urine Culture:        COVID Labs  CRP:      D-Dimer:            Imaging reviewed independently and reviewed read        MEDICATIONS  (STANDING):  diltiazem    Tablet 60 milliGRAM(s) Oral every 6 hours  enoxaparin Injectable 115 milliGRAM(s) SubCutaneous every 12 hours  furosemide    Tablet 40 milliGRAM(s) Oral two times a day  magnesium oxide 400 milliGRAM(s) Oral three times a day with meals  metoprolol tartrate 25 milliGRAM(s) Oral two times a day    MEDICATIONS  (PRN):  ALBUTerol    90 MICROgram(s) HFA Inhaler 2 Puff(s) Inhalation every 6 hours PRN Wheezing         Pt seen and examined at bedside. No CP or SOB. Pt feeling better.  Pt states that she has a primary care doctor and sees every year. She does not have a cardiologist.     PAST MEDICAL & SURGICAL HISTORY:  No pertinent past medical history  No significant past surgical history    VITAL SIGNS (Last 24 hrs):  T(C): 36.5 (06-18-22 @ 08:02), Max: 36.9 (06-17-22 @ 15:32)  HR: 76 (06-18-22 @ 08:02) (44 - 125)  BP: 106/65 (06-18-22 @ 08:02) (106/65 - 149/79)  RR: 18 (06-18-22 @ 08:02) (18 - 18)  SpO2: 96% (06-18-22 @ 08:02) (94% - 97%)  Wt(kg): --  Daily     Daily     I&O's Summary      PHYSICAL EXAM:  GENERAL: NAD, well-developed  HEAD:  Atraumatic, Normocephalic  EYES: EOMI, PERRLA, conjunctiva and sclera clear  NECK: Supple, No JVD  CHEST/LUNG: Clear to auscultation bilaterally; No wheeze  HEART: irregular/irregular; No murmurs, rubs, or gallops  ABDOMEN: Soft, Nontender, Nondistended; Bowel sounds present  EXTREMITIES:  2+ Peripheral Pulses, No clubbing, cyanosis, + TRACE EDema  PSYCH: AAOx3  NEUROLOGY: non-focal  SKIN: No rashes or lesions    Labs Reviewed  Spoke to patient in regards to abnormal labs.  CBC Full  -  ( 18 Jun 2022 10:15 )  WBC Count : 7.95 K/uL  Hemoglobin : 13.3 g/dL  Hematocrit : 40.1 %  Platelet Count - Automated : 194 K/uL  Mean Cell Volume : 100.5 fL  Mean Cell Hemoglobin : 33.3 pg  Mean Cell Hemoglobin Concentration : 33.2 g/dL  Auto Neutrophil # : 6.06 K/uL  Auto Lymphocyte # : 1.24 K/uL  Auto Monocyte # : 0.49 K/uL  Auto Eosinophil # : 0.06 K/uL  Auto Basophil # : 0.06 K/uL  Auto Neutrophil % : 76.1 %  Auto Lymphocyte % : 15.6 %  Auto Monocyte % : 6.2 %  Auto Eosinophil % : 0.8 %  Auto Basophil % : 0.8 %    BMP:    06-18 @ 10:15    Blood Urea Nitrogen - 13  Calcium - 9.4  Carbond Dioxide - 23  Chloride - 94  Creatinine - 1.0  Glucose - 177  Potassium - 3.9  Sodium - 135      Imaging reviewed independently and reviewed read    < from: Xray Chest 2 Views PA/Lat (06.17.22 @ 10:24) >  Impression:    Bilateral opacifications. Cardiomegaly.    < end of copied text >      MEDICATIONS  (STANDING):  diltiazem    Tablet 60 milliGRAM(s) Oral every 6 hours  enoxaparin Injectable 115 milliGRAM(s) SubCutaneous every 12 hours  furosemide    Tablet 40 milliGRAM(s) Oral two times a day  magnesium oxide 400 milliGRAM(s) Oral three times a day with meals  metoprolol tartrate 25 milliGRAM(s) Oral two times a day    MEDICATIONS  (PRN):  ALBUTerol    90 MICROgram(s) HFA Inhaler 2 Puff(s) Inhalation every 6 hours PRN Wheezing

## 2022-06-19 LAB — SARS-COV-2 RNA SPEC QL NAA+PROBE: SIGNIFICANT CHANGE UP

## 2022-06-19 PROCEDURE — 99233 SBSQ HOSP IP/OBS HIGH 50: CPT

## 2022-06-19 PROCEDURE — 93306 TTE W/DOPPLER COMPLETE: CPT | Mod: 26

## 2022-06-19 RX ADMIN — Medication 40 MILLIGRAM(S): at 05:46

## 2022-06-19 RX ADMIN — Medication 25 MILLIGRAM(S): at 05:47

## 2022-06-19 RX ADMIN — Medication 25 MILLIGRAM(S): at 18:04

## 2022-06-19 RX ADMIN — ENOXAPARIN SODIUM 115 MILLIGRAM(S): 100 INJECTION SUBCUTANEOUS at 18:11

## 2022-06-19 RX ADMIN — Medication 60 MILLIGRAM(S): at 23:04

## 2022-06-19 RX ADMIN — ENOXAPARIN SODIUM 115 MILLIGRAM(S): 100 INJECTION SUBCUTANEOUS at 05:47

## 2022-06-19 RX ADMIN — MAGNESIUM OXIDE 400 MG ORAL TABLET 400 MILLIGRAM(S): 241.3 TABLET ORAL at 12:11

## 2022-06-19 RX ADMIN — MAGNESIUM OXIDE 400 MG ORAL TABLET 400 MILLIGRAM(S): 241.3 TABLET ORAL at 09:56

## 2022-06-19 RX ADMIN — Medication 60 MILLIGRAM(S): at 12:07

## 2022-06-19 RX ADMIN — Medication 60 MILLIGRAM(S): at 00:03

## 2022-06-19 RX ADMIN — Medication 60 MILLIGRAM(S): at 05:46

## 2022-06-19 RX ADMIN — Medication 40 MILLIGRAM(S): at 15:52

## 2022-06-19 RX ADMIN — Medication 60 MILLIGRAM(S): at 18:04

## 2022-06-19 RX ADMIN — MAGNESIUM OXIDE 400 MG ORAL TABLET 400 MILLIGRAM(S): 241.3 TABLET ORAL at 18:05

## 2022-06-19 NOTE — PROGRESS NOTE ADULT - SUBJECTIVE AND OBJECTIVE BOX
Pt seen and examined at bedside. No CP or SOB. still in aflutter on cardiac telemonitoring           PAST MEDICAL & SURGICAL HISTORY:  No pertinent past medical history    No significant past surgical history        VITAL SIGNS (Last 24 hrs):  T(C): 35.7 (22 @ 13:11), Max: 37.1 (22 @ 15:48)  HR: 83 (22 @ 13:11) (69 - 112)  BP: 132/57 (22 @ 13:11) (111/70 - 149/75)  RR: 18 (22 @ 13:11) (18 - 18)  SpO2: 99% (22 @ 17:57) (97% - 99%)  Wt(kg): --  Daily Height in cm: 172.72 (2022 18:40)    Daily Weight in k.4 (2022 05:33)    I&O's Summary    2022 07:01  -  2022 07:00  --------------------------------------------------------  IN: 500 mL / OUT: 500 mL / NET: 0 mL    2022 07:01  -  2022 14:20  --------------------------------------------------------  IN: 551 mL / OUT: 0 mL / NET: 551 mL        PHYSICAL EXAM:  GENERAL: NAD, well-developed  HEAD:  Atraumatic, Normocephalic  EYES: EOMI, PERRLA, conjunctiva and sclera clear  NECK: Supple, No JVD  CHEST/LUNG: Clear to auscultation bilaterally; No wheeze  HEART: irregular; No murmurs, rubs, or gallops  ABDOMEN: Soft, Nontender, Nondistended; Bowel sounds present  EXTREMITIES:  2+ Peripheral Pulses, No clubbing, cyanosis, or edema  PSYCH: AAOx3  NEUROLOGY: non-focal  SKIN: No rashes or lesions    Labs Reviewed  Spoke to patient in regards to abnormal labs.    CBC Full  -  ( 2022 10:15 )  WBC Count : 7.95 K/uL  Hemoglobin : 13.3 g/dL  Hematocrit : 40.1 %  Platelet Count - Automated : 194 K/uL  Mean Cell Volume : 100.5 fL  Mean Cell Hemoglobin : 33.3 pg  Mean Cell Hemoglobin Concentration : 33.2 g/dL  Auto Neutrophil # : 6.06 K/uL  Auto Lymphocyte # : 1.24 K/uL  Auto Monocyte # : 0.49 K/uL  Auto Eosinophil # : 0.06 K/uL  Auto Basophil # : 0.06 K/uL  Auto Neutrophil % : 76.1 %  Auto Lymphocyte % : 15.6 %  Auto Monocyte % : 6.2 %  Auto Eosinophil % : 0.8 %  Auto Basophil % : 0.8 %    BMP:     @ 10:15    Blood Urea Nitrogen - 13  Calcium - 9.4  Carbond Dioxide - 23  Chloride - 94  Creatinine - 1.0  Glucose - 177  Potassium - 3.9  Sodium - 135      Hemoglobin A1c -     Urine Culture:        COVID Labs  CRP:      D-Dimer:          echo pending, completed       MEDICATIONS  (STANDING):  diltiazem    Tablet 60 milliGRAM(s) Oral every 6 hours  enoxaparin Injectable 115 milliGRAM(s) SubCutaneous every 12 hours  furosemide    Tablet 40 milliGRAM(s) Oral two times a day  magnesium oxide 400 milliGRAM(s) Oral three times a day with meals  metoprolol tartrate 25 milliGRAM(s) Oral two times a day    MEDICATIONS  (PRN):  ALBUTerol    90 MICROgram(s) HFA Inhaler 2 Puff(s) Inhalation every 6 hours PRN Wheezing

## 2022-06-19 NOTE — PROGRESS NOTE ADULT - SUBJECTIVE AND OBJECTIVE BOX
Subjective: pt feels well ambulating, no cp nor sob. palpitations improved.       MEDICATIONS  (STANDING):  diltiazem    Tablet 60 milliGRAM(s) Oral every 6 hours  enoxaparin Injectable 115 milliGRAM(s) SubCutaneous every 12 hours  furosemide    Tablet 40 milliGRAM(s) Oral two times a day  magnesium oxide 400 milliGRAM(s) Oral three times a day with meals  metoprolol tartrate 25 milliGRAM(s) Oral two times a day    MEDICATIONS  (PRN):  ALBUTerol    90 MICROgram(s) HFA Inhaler 2 Puff(s) Inhalation every 6 hours PRN Wheezing            Vital Signs Last 24 Hrs  T(C): 35.6 (19 Jun 2022 05:33), Max: 37.1 (18 Jun 2022 15:48)  T(F): 96.1 (19 Jun 2022 05:33), Max: 98.7 (18 Jun 2022 15:48)  HR: 104 (19 Jun 2022 05:50) (69 - 112)  BP: 115/66 (19 Jun 2022 05:50) (111/70 - 149/75)  BP(mean): --  RR: 18 (19 Jun 2022 05:33) (18 - 18)  SpO2: 99% (18 Jun 2022 17:57) (97% - 99%)             REVIEW OF SYSTEMS:  CONSTITUTIONAL: no fever, no chills, no diaphoresis  CARDIOLOGY: no chest pain, no SOB, no palpitation, no diaphoresis, no faint   RESPIRATORY: no dyspnea, no wheeze, no orthopnea, no PND   NEUROLOGICAL: no dizziness, headache, focal deficits to report.  GI: no abdominal pain, no dyspepsia, no nausea, no vomiting, no diarrhea.    HEENT: no congestion, no nasal bleeding  SKIN: no ecchymosis, no ptechia             PHYSICAL EXAM:  · CONSTITUTIONAL: Looks stable, in no diress  . NECK: Supple, no JVD, no bruit on either carotid side   · RESPIRATORY: Normal air entry to lung base, no wheeze, no crackle, no wet rales  · CARDIOVASCULAR: Normal S1, A2, P2, no murmur, no click, regular rate,  no rub,  · EXTREMITIES: No cyanosis, no clubbing, no edema  · VASCULAR: Pulses are regular, equal, bilateral in upper and lower extremities  	  TELEMETRY:  afib, mod v response  ECG:Diagnosis Line Atrial fibrillation with rapid ventricular response  Rightward axis  Low voltage QRS  Cannot rule out Anterior infarct , age undetermined  Abnormal ECG    Confirmed by Marilee Tavera MD ([id number]) on 6/18/2022 8:01:28 AM      TTE:    LABS:                        13.3   7.95  )-----------( 194      ( 18 Jun 2022 10:15 )             40.1     06-18    135  |  94<L>  |  13  ----------------------------<  177<H>  3.9   |  23  |  1.0    Ca    9.4      18 Jun 2022 10:15  Mg     1.7     06-18    TPro  6.6  /  Alb  4.3  /  TBili  1.2  /  DBili  x   /  AST  33  /  ALT  22  /  AlkPhos  70  06-18    CARDIAC MARKERS ( 18 Jun 2022 11:36 )  x     / <0.01 ng/mL / x     / x     / x      CARDIAC MARKERS ( 17 Jun 2022 16:03 )  x     / 0.01 ng/mL / x     / x     / x              I&O's Summary    18 Jun 2022 07:01  -  19 Jun 2022 07:00  --------------------------------------------------------  IN: 500 mL / OUT: 500 mL / NET: 0 mL      BNP  RADIOLOGY & ADDITIONAL STUDIES:    IMPRESSION AND PLAN:

## 2022-06-19 NOTE — PROGRESS NOTE ADULT - ASSESSMENT
#CHF exacerbation, acute, suspected HFpEF likely 2/2 Afib/aflutter    #afib with RVR  - s/p diuresis in ED with improvement in dyspnea  - continue po 40 mg daily   - cardiology appreciated  - med-tele monitoring, strict ins/outs, 1.5L fluid restriction  - f/u TTE echo pending   - trops 0.02-->0.01  - continue Cardizem 60 mg q6h  - continue lorpessor 25 mg BID   - continue cardiac telemonitoring  - will need DIANNE on monday  - lovenox 115 mg BID   - cardiology spoke to pt regarding bleeding risks as pt will need eliquis on DC   - Discussed benefits and risks of starting anticoagulation to prevent strokes from Afib including risk of excessively bleeding gums or nose bleeds, hematuria,  hemorrhagic stroke, GI bleed,  excessive bleeding after trauma or cuts and even death. Advised seek medical intervention immediately. Pt decided to start anticoagulation given benefits outweighs risk.      #Mild troponemia with atypical chest pain (resolved)  - EKG no acute ischemic changes  - stable likely ischemic demand from tachycardia       #HTN  - c/w home metoprolol ER 25mg    #Obesity  - weight loss and exercise counselling  - DASH/ CC diet    #Hypomagnesemia on admission  - Repleted in ED  - f/u am level    #Progress Note Handoff  Pending (specify):  DIANNE on Monday, follow up cardiology, continue cardiac telemonitoring   Family discussion: updated pt at bedside  Disposition: home  Anticipated date: 6/20

## 2022-06-19 NOTE — PROGRESS NOTE ADULT - ASSESSMENT
Pt with pafib with controlled v rate  echo f/u ef  cont current meds  cont lovenox  pt for corey and d/c cv tomorrow  'please make npo after midnight and get covid test  f/u tsh

## 2022-06-20 ENCOUNTER — TRANSCRIPTION ENCOUNTER (OUTPATIENT)
Age: 57
End: 2022-06-20

## 2022-06-20 DIAGNOSIS — R29.818 OTHER SYMPTOMS AND SIGNS INVOLVING THE NERVOUS SYSTEM: ICD-10-CM

## 2022-06-20 LAB
ALBUMIN SERPL ELPH-MCNC: 4.3 G/DL — SIGNIFICANT CHANGE UP (ref 3.5–5.2)
ALP SERPL-CCNC: 78 U/L — SIGNIFICANT CHANGE UP (ref 30–115)
ALT FLD-CCNC: 21 U/L — SIGNIFICANT CHANGE UP (ref 0–41)
ANION GAP SERPL CALC-SCNC: 14 MMOL/L — SIGNIFICANT CHANGE UP (ref 7–14)
AST SERPL-CCNC: 20 U/L — SIGNIFICANT CHANGE UP (ref 0–41)
BILIRUB SERPL-MCNC: 0.5 MG/DL — SIGNIFICANT CHANGE UP (ref 0.2–1.2)
BUN SERPL-MCNC: 13 MG/DL — SIGNIFICANT CHANGE UP (ref 10–20)
CALCIUM SERPL-MCNC: 9.8 MG/DL — SIGNIFICANT CHANGE UP (ref 8.5–10.1)
CHLORIDE SERPL-SCNC: 94 MMOL/L — LOW (ref 98–110)
CO2 SERPL-SCNC: 26 MMOL/L — SIGNIFICANT CHANGE UP (ref 17–32)
CREAT SERPL-MCNC: 0.9 MG/DL — SIGNIFICANT CHANGE UP (ref 0.7–1.5)
EGFR: 75 ML/MIN/1.73M2 — SIGNIFICANT CHANGE UP
GLUCOSE SERPL-MCNC: 201 MG/DL — HIGH (ref 70–99)
HCT VFR BLD CALC: 38.6 % — SIGNIFICANT CHANGE UP (ref 37–47)
HGB BLD-MCNC: 12.8 G/DL — SIGNIFICANT CHANGE UP (ref 12–16)
MAGNESIUM SERPL-MCNC: 1.9 MG/DL — SIGNIFICANT CHANGE UP (ref 1.8–2.4)
MCHC RBC-ENTMCNC: 32.7 PG — HIGH (ref 27–31)
MCHC RBC-ENTMCNC: 33.2 G/DL — SIGNIFICANT CHANGE UP (ref 32–37)
MCV RBC AUTO: 98.7 FL — SIGNIFICANT CHANGE UP (ref 81–99)
NRBC # BLD: 0 /100 WBCS — SIGNIFICANT CHANGE UP (ref 0–0)
PLATELET # BLD AUTO: 298 K/UL — SIGNIFICANT CHANGE UP (ref 130–400)
POTASSIUM SERPL-MCNC: 3.5 MMOL/L — SIGNIFICANT CHANGE UP (ref 3.5–5)
POTASSIUM SERPL-SCNC: 3.5 MMOL/L — SIGNIFICANT CHANGE UP (ref 3.5–5)
PROT SERPL-MCNC: 6.3 G/DL — SIGNIFICANT CHANGE UP (ref 6–8)
RBC # BLD: 3.91 M/UL — LOW (ref 4.2–5.4)
RBC # FLD: 14.7 % — HIGH (ref 11.5–14.5)
SODIUM SERPL-SCNC: 134 MMOL/L — LOW (ref 135–146)
WBC # BLD: 8.54 K/UL — SIGNIFICANT CHANGE UP (ref 4.8–10.8)
WBC # FLD AUTO: 8.54 K/UL — SIGNIFICANT CHANGE UP (ref 4.8–10.8)

## 2022-06-20 PROCEDURE — 99233 SBSQ HOSP IP/OBS HIGH 50: CPT

## 2022-06-20 RX ORDER — METOPROLOL TARTRATE 50 MG
50 TABLET ORAL
Refills: 0 | Status: DISCONTINUED | OUTPATIENT
Start: 2022-06-20 | End: 2022-06-20

## 2022-06-20 RX ORDER — AMIODARONE HYDROCHLORIDE 400 MG/1
1 TABLET ORAL
Qty: 900 | Refills: 0 | Status: DISCONTINUED | OUTPATIENT
Start: 2022-06-20 | End: 2022-06-21

## 2022-06-20 RX ORDER — AMIODARONE HYDROCHLORIDE 400 MG/1
150 TABLET ORAL ONCE
Refills: 0 | Status: COMPLETED | OUTPATIENT
Start: 2022-06-20 | End: 2022-06-20

## 2022-06-20 RX ORDER — MAGNESIUM SULFATE 500 MG/ML
2 VIAL (ML) INJECTION ONCE
Refills: 0 | Status: COMPLETED | OUTPATIENT
Start: 2022-06-20 | End: 2022-06-20

## 2022-06-20 RX ORDER — AMIODARONE HYDROCHLORIDE 400 MG/1
0.03 TABLET ORAL
Qty: 900 | Refills: 0 | Status: DISCONTINUED | OUTPATIENT
Start: 2022-06-20 | End: 2022-06-20

## 2022-06-20 RX ORDER — SACUBITRIL AND VALSARTAN 24; 26 MG/1; MG/1
1 TABLET, FILM COATED ORAL
Refills: 0 | Status: DISCONTINUED | OUTPATIENT
Start: 2022-06-20 | End: 2022-06-20

## 2022-06-20 RX ORDER — AMIODARONE HYDROCHLORIDE 400 MG/1
0.5 TABLET ORAL
Qty: 900 | Refills: 0 | Status: DISCONTINUED | OUTPATIENT
Start: 2022-06-20 | End: 2022-06-21

## 2022-06-20 RX ORDER — METOPROLOL TARTRATE 50 MG
100 TABLET ORAL
Refills: 0 | Status: DISCONTINUED | OUTPATIENT
Start: 2022-06-20 | End: 2022-06-21

## 2022-06-20 RX ADMIN — Medication 25 MILLIGRAM(S): at 05:36

## 2022-06-20 RX ADMIN — Medication 60 MILLIGRAM(S): at 05:36

## 2022-06-20 RX ADMIN — AMIODARONE HYDROCHLORIDE 600 MILLIGRAM(S): 400 TABLET ORAL at 16:31

## 2022-06-20 RX ADMIN — ENOXAPARIN SODIUM 115 MILLIGRAM(S): 100 INJECTION SUBCUTANEOUS at 05:36

## 2022-06-20 RX ADMIN — Medication 40 MILLIGRAM(S): at 14:17

## 2022-06-20 RX ADMIN — AMIODARONE HYDROCHLORIDE 33.3 MG/MIN: 400 TABLET ORAL at 16:31

## 2022-06-20 RX ADMIN — Medication 50 MILLIGRAM(S): at 14:17

## 2022-06-20 RX ADMIN — Medication 100 MILLIGRAM(S): at 18:26

## 2022-06-20 RX ADMIN — Medication 40 MILLIGRAM(S): at 05:36

## 2022-06-20 RX ADMIN — ENOXAPARIN SODIUM 115 MILLIGRAM(S): 100 INJECTION SUBCUTANEOUS at 18:26

## 2022-06-20 RX ADMIN — MAGNESIUM OXIDE 400 MG ORAL TABLET 400 MILLIGRAM(S): 241.3 TABLET ORAL at 18:26

## 2022-06-20 RX ADMIN — Medication 25 GRAM(S): at 14:18

## 2022-06-20 NOTE — PROGRESS NOTE ADULT - SUBJECTIVE AND OBJECTIVE BOX
Hospital Day:  3d    Subjective:    Patient is a 56y old  Female who presents with a chief complaint of palpitations (19 Jun 2022 14:15)      Admitted to medicine for a primary diagnosis of     Past Medical Hx:   No pertinent past medical history      Past Sx:  No significant past surgical history      Allergies:  No Known Allergies    Current Meds:   Standng Meds:  diltiazem    Tablet 60 milliGRAM(s) Oral every 6 hours  enoxaparin Injectable 115 milliGRAM(s) SubCutaneous every 12 hours  furosemide    Tablet 40 milliGRAM(s) Oral two times a day  magnesium oxide 400 milliGRAM(s) Oral three times a day with meals  metoprolol tartrate 25 milliGRAM(s) Oral two times a day    PRN Meds:  ALBUTerol    90 MICROgram(s) HFA Inhaler 2 Puff(s) Inhalation every 6 hours PRN Wheezing    HOME MEDICATIONS:  ALBUTEROL HFA 90 MCG INHALER: 2 PUFFS AS NEEDED BY INHALATION EVERY 6 HRS FOR 7 DAYS  METOPROLOL TARTRATE 25 MG TAB: TAKE 1 TABLET BY MOUTH TWICE A DAY      Vital Signs:   T(F): 96 (06-20-22 @ 05:13), Max: 97.1 (06-19-22 @ 21:10)  HR: 96 (06-20-22 @ 05:13) (73 - 96)  BP: 121/96 (06-20-22 @ 05:13) (121/96 - 154/72)  RR: 18 (06-20-22 @ 05:13) (18 - 18)  SpO2: --      06-19-22 @ 07:01  -  06-20-22 @ 07:00  --------------------------------------------------------  IN: 551 mL / OUT: 0 mL / NET: 551 mL        Physical Exam:   GENERAL: NAD  HEENT: NCAT  CHEST/LUNG: CTAB  HEART: Regular rate and rhythm; s1 s2 appreciated, No murmurs, rubs, or gallops  ABDOMEN: Soft, Nontender, Nondistended; Bowel sounds present  EXTREMITIES: No LE edema b/l  SKIN: no rashes, no new lesions  NERVOUS SYSTEM:  Alert & Oriented X3  LINES/CATHETERS:        Labs:                         13.3   7.95  )-----------( 194      ( 18 Jun 2022 10:15 )             40.1       18 Jun 2022 10:15    135    |  94     |  13     ----------------------------<  177    3.9     |  23     |  1.0      Ca    9.4        18 Jun 2022 10:15  Mg     1.7       18 Jun 2022 10:15    TPro  6.6    /  Alb  4.3    /  TBili  1.2    /  DBili  x      /  AST  33     /  ALT  22     /  AlkPhos  70     18 Jun 2022 10:15            Serum Pro-Brain Natriuretic Peptide: 2340 pg/mL (06-17-22 @ 10:19)    Troponin <0.01, CKMB --, CK -- 06-18-22 @ 11:36  Troponin 0.01, CKMB --, CK -- 06-17-22 @ 16:03  Troponin 0.02, CKMB --, CK -- 06-17-22 @ 10:19

## 2022-06-20 NOTE — PROGRESS NOTE ADULT - ASSESSMENT
#CHF exacerbation, acute, ejection fraction unknown  - s/p diuresis in ED with improvement in dyspnea  - c/w IV diuresis for now  - med-tele monitoring, strict ins/outs, 1.5L fluid restriction  - f/u TTE echo      #Mild troponemia with atypical chest pain (resolved  - EKG no acute ischemic changes  - f/u 3rd trop at 4pm      #b/l lower extremity edema  - etiology most likely CHF exasp but given recent international travel, obesity cannot rule out DVT  - LE duplex ordered--> f/u  - prophylactic Lovenox for now    #HTN  - c/w home metoprolol ER 25mg    #Obesity  - weight loss and exercise counselling  - DASH/ CC diet    #Hypomagnesemia on admission  - Repleted in ED  - f/u am level    #Code status: full code #CHF exacerbation, acute, ejection fraction unknown  - s/p diuresis in ED with improvement in dyspnea  - Cont Lasix 40 mg po q 12h  - d/c diltiazem  -  Metoprolol to 50 mg po q 12h, Entresto 24/26 mg po q 12h.   - Check i's and o's and daily wt  - Low salt diet and water restriction to 1.5 L/D  - med-tele monitoring, strict ins/outs, 1.5L fluid restriction  - ECHO showed EF is 25-30%. Multiple regional wall motion abnormalities.     #b/l lower extremity edema  - etiology most likely CHF exasp but given recent international travel, obesity cannot rule out DVT  - LE duplex ordered  - prophylactic Lovenox for now      # Afib  - keep tele  - CE x 2 are negative  - Scheduled for CV today  - LDL is 141  - F/u TSH level    # Suspected JAY  - Will need sleep study as an out pt      #HTN  - c/w home metoprolol ER 25mg    #Obesity  - weight loss and exercise counselling  - DASH/ CC diet    #Hypomagnesemia on admission  - Repleted in ED  - f/u am level    #Code status: full code

## 2022-06-20 NOTE — DISCHARGE NOTE PROVIDER - CARE PROVIDER_API CALL
Adán Jiang  CARDIOVASCULAR DISEASE  [address]  [place] [address]  Phone: [phone]  Fax: [phone]  Follow Up Time:     Nash Stout ()  Medicine  [address], [address] [place] [address]  Phone: [phone]  Fax: [phone]  Follow Up Time:     Barrie Macias (MD)  Critical Care Medicine; Pulmonary Disease; Sleep Medicine  [address] [place] [address]  [place] [address]  Phone: [phone]  Fax: [phone]  Follow Up Time:

## 2022-06-20 NOTE — DISCHARGE NOTE PROVIDER - NSDCCPCAREPLAN_GEN_ALL_CORE_FT
PRINCIPAL DISCHARGE DIAGNOSIS  Diagnosis: Atrial fibrillation  Assessment and Plan of Treatment: Please continue to take your medications as prescribed and follow up with your cardiologist as an outpatient in 1-2 weeks.      SECONDARY DISCHARGE DIAGNOSES  Diagnosis: Congestive heart failure (CHF)  Assessment and Plan of Treatment: Your echocardiogram revealed a low ejection fraction of 25 to 30%. You were treated with diuretics while inpatient. Please follow up with your cardiologist as an outpaient in 1-2 weeks after discharge.     PRINCIPAL DISCHARGE DIAGNOSIS  Diagnosis: Atrial fibrillation  Assessment and Plan of Treatment: You underwent a cardioversion procedure, whic was unsuccessful. Please continue to take your medications as prescribed,  including your anticoagulant,  and follow up with your cardiologist as an outpatient in 1-2 weeks for possible repeat cardioversion.      SECONDARY DISCHARGE DIAGNOSES  Diagnosis: Congestive heart failure (CHF)  Assessment and Plan of Treatment: Your echocardiogram revealed a low ejection fraction of 25 to 30%. You were treated with diuretics while inpatient. Please follow up with your cardiologist as an outpaient in 1-2 weeks after discharge.

## 2022-06-20 NOTE — DISCHARGE NOTE PROVIDER - NSDCMRMEDTOKEN_GEN_ALL_CORE_FT
ALBUTEROL HFA 90 MCG INHALER: 2 PUFFS AS NEEDED BY INHALATION EVERY 6 HRS FOR 7 DAYS  METOPROLOL TARTRATE 25 MG TAB: TAKE 1 TABLET BY MOUTH TWICE A DAY   ALBUTEROL HFA 90 MCG INHALER: 2 puff(s) inhaled once a day, As Needed  amiodarone 200 mg oral tablet: 1 tab(s) orally 2 times a day MDD:400mg daily  amiodarone 200 mg oral tablet: 1 tab(s) orally once a day MDD:200mg  losartan 25 mg oral tablet: 1 tab(s) orally once a day   metoprolol tartrate 100 mg oral tablet: 1 tab(s) orally 2 times a day  rivaroxaban 20 mg oral tablet: 1 tab(s) orally once a day (before a meal) MDD:20mg  torsemide 20 mg oral tablet: 1 tab(s) orally once a day MDD:20mg

## 2022-06-20 NOTE — DISCHARGE NOTE PROVIDER - PROVIDER TOKENS
PROVIDER:[TOKEN:[[id number]]],PROVIDER:[TOKEN:[[id number]]],PROVIDER:[TOKEN:[[id number]]]

## 2022-06-20 NOTE — PROGRESS NOTE ADULT - ASSESSMENT
56-year-old female no past medical history (does not follow with any doctors) presenting with shortness of breath that has been gradually worsening over the last few weeks, to the point where she now becomes out of breath after taking only a few steps.      Acute HFpEF  PAF  Obesity / Likely JAY  DL             PLAN:    ·	Cont tele  ·	CE x 2 are negative  ·	ECHO showed EF is 25-30%. Multiple regional wall motion abnormalities.   ·	Cont Lasix 40 mg po q 12h  ·	Check i's and o's and daily wt  ·	Low salt diet and water restriction to 1.5 L/D  ·	Scheduled for CV today  ·	Will d/c diltiazem  ·	Increase Metoprolol to 50 mg po q 12h  ·	Will start her on Entresto 24/26 mg po q 12h.   ·	Will need sleep study as an out pt  ·	Replete Mg. Give Magnesium sulfate 2 gm iv x 1 dose.   ·	Monitor electrolytes.   ·	LDL is 141. Check TSH level 56-year-old female no past medical history (does not follow with any doctors) presenting with shortness of breath that has been gradually worsening over the last few weeks, to the point where she now becomes out of breath after taking only a few steps.      Acute HFpEF  PAF  Obesity / Likely JAY  DL             PLAN:    ·	Cont tele  ·	CE x 2 are negative  ·	ECHO showed EF is 25-30%. Multiple regional wall motion abnormalities.   ·	Cont Lasix 40 mg po q 12h  ·	Check i's and o's and daily wt  ·	Low salt diet and water restriction to 1.5 L/D  ·	Scheduled for CV today  ·	Will d/c diltiazem  ·	Increase Metoprolol to 50 mg po q 12h  ·	Will start her on Entresto 24/26 mg po q 12h.   ·	Will need sleep study as an out pt  ·	Replete Mg. Give Magnesium sulfate 2 gm iv x 1 dose.   ·	Monitor electrolytes.   ·	LDL is 141. Check TSH level    ADDENDUM:    CV x 2 unsuccessful. Cardiology recommended to start her on Amiodarone drip. Hold Entresto. Increase Metoprolol to 100 mg po q 12h.    Progress Note Handoff    Pending (specify):  Consults_________, Tests________, Test Results_______, Other_ unsuccessful CV. On Amiodarone drip. ________  Family discussion:  Disposition: Home___/SNF___/Other________/Unknown at this time________    Matt Hartman MD  Spectra: [id number]

## 2022-06-20 NOTE — PROGRESS NOTE ADULT - SUBJECTIVE AND OBJECTIVE BOX
JYOTHI PLASCENCIA  56y Female    CHIEF COMPLAINT:    Patient is a 56y old  Female who presents with a chief complaint of palpitations (2022 14:15)      INTERVAL HPI/OVERNIGHT EVENTS:    Patient seen and examined.    ROS: All other systems are negative.    Vital Signs:    T(F): 96 (22 @ 05:13), Max: 97.1 (22 @ 21:10)  HR: 96 (22 @ 05:13) (73 - 96)  BP: 121/96 (22 @ 05:13) (121/96 - 154/72)  RR: 18 (22 @ 05:13) (18 - 18)  SpO2: --  I&O's Summary    2022 07:01  -  2022 07:00  --------------------------------------------------------  IN: 551 mL / OUT: 0 mL / NET: 551 mL      Daily Height in cm: 172.72 (2022 11:56)    Daily Weight in k.3 (2022 05:13)  CAPILLARY BLOOD GLUCOSE          PHYSICAL EXAM:    GENERAL:  NAD  SKIN: No rashes or lesions  HENT: Atraumatic. Normocephalic. PERRL. Moist membranes.  NECK: Supple, No JVD. No lymphadenopathy.  PULMONARY: CTA B/L. No wheezing. No rales  CVS: Normal S1, S2. Rate and Rhythm are regular. No murmurs.  ABDOMEN/GI: Soft, Nontender, Nondistended; BS present  EXTREMITIES: Peripheral pulses intact. No edema B/L LE.  NEUROLOGIC:  No motor or sensory deficit.  PSYCH: Alert & oriented x 3    Consultant(s) Notes Reviewed:  [x ] YES  [ ] NO  Care Discussed with Consultants/Other Providers [ x] YES  [ ] NO    EKG reviewed  Telemetry reviewed    LABS:            Serum Pro-Brain Natriuretic Peptide: 2340 pg/mL (22 @ 10:19)    Trop <0.01, CKMB --, CK --, 22 @ 11:36  Trop 0.01, CKMB --, CK --, 22 @ 16:03        RADIOLOGY & ADDITIONAL TESTS:    < from: TTE Echo Complete w/o Contrast w/ Doppler (22 @ 09:31) >    Summary:   1. Left ventricular ejection fraction, by visual estimation, is 25 to   30%.   2. Severely decreased global left ventricular systolic function.   3. Multiple left ventricular regional wall motion abnormalities exist.   See wall motion findings.   4. Spectral Doppler shows impaired relaxation pattern of left   ventricular myocardial filling (Grade I diastolic dysfunction).   5. Mildly enlarged left atrium.   6. Mildly enlarged right atrium.   7. Mild-to-moderate mitral regurgitation.   8. Mild-to-moderate tricuspid regurgitation.   9. Estimated pulmonary artery systolic pressure is 38.0 mmHg assuming a   right atrial pressure of 15 mmHg, which is consistent with borderline   pulmonary hypertension.    < end of copied text >  < from: Xray Chest 2 Views PA/Lat (22 @ 10:24) >    Impression:    Bilateral opacifications. Cardiomegaly.      < end of copied text >    Imaging or report Personally Reviewed:  [x ] YES  [ ] NO    Medications:  Standing  diltiazem    Tablet 60 milliGRAM(s) Oral every 6 hours  enoxaparin Injectable 115 milliGRAM(s) SubCutaneous every 12 hours  furosemide    Tablet 40 milliGRAM(s) Oral two times a day  magnesium oxide 400 milliGRAM(s) Oral three times a day with meals  metoprolol tartrate 25 milliGRAM(s) Oral two times a day    PRN Meds  ALBUTerol    90 MICROgram(s) HFA Inhaler 2 Puff(s) Inhalation every 6 hours PRN      Case discussed with resident    Care discussed with pt/family

## 2022-06-20 NOTE — CHART NOTE - NSCHARTNOTEFT_GEN_A_CORE
POST OPERATIVE PROCEDURAL DOCUMENTATION  PRE-OP DIAGNOSIS:  AF      POST-OP DIAGNOSIS:  AF s/p UNsuccessful CV    PROCEDURE: Transesophageal echocardiogram    Primary Physician:      Assistant: Dr. Saeed    ANESTHESIA TYPE  [  ] General Anesthesia  [ x ] Conscious Sedation  [  ] Local/Regional    CONDITION  [  ] Critical  [  ] Serious  [  ] Fair  [ x ] Good    SPECIMENS REMOVED (IF APPLICABLE): N/A    IMPLANTS (IF APPLICABLE): None    ESTIMATED BLOOD LOSS: None    COMPLICATIONS: None      FINDINGS:    After risks and benefits of procedures were explained, informed consent was obtained and placed in chart. Refer to Anesthesia note for sedation details.  The DIANNE probe was passed into the esophagus without difficulty.  Transesophageal and transgastric images were obtained.  The DIANNE probe was removed without difficulty and examined.  There was no evidence for bleeding.  The patient tolerated the procedure well without any immediate DIANNE-related complications.      Preliminary Findings:  LA:   enlarged  ÓSCAR: Left atrial appendage was clear of clot and smoke.  LV: LVEF 30-35  MV: moderate MR, no evidence of MS.   AV: No evidence of AI, no evidence of AS.   RA:  TV: mod TR.   PV: trace PI.   IAS: no PFO. No R-> L shunt.   Aorta:  simple atheroma of aortic arch / desc aorta        Patient received CV - unsuccessful.  Had transient conversion to SR before reverting back into AF.      DIAGNOSIS/IMPRESSION:  AF s/p UNsuccessful CV attempts x 2.     PLAN OF CARE:  return to floor  f/u with cardiologist  increase lopressor to 100 bid.   start on amiodarone load/gtt at 1x6h then .5x18h.  once complete --> can switch to amio 200mg bid.

## 2022-06-20 NOTE — DISCHARGE NOTE PROVIDER - HOSPITAL COURSE
History of Present Illness:   56-year-old female no past medical history (does not follow with any doctors) presenting with shortness of breath that has been gradually worsening over the last few weeks, two the point where she now becomes out of breath after taking only a few steps. Also endorses frequent nighttime awakenings with need to prop her head up on pillows during this time frame. Also endorses lower extremity swelling. She is not aware of any heart conditions but admits she once saw a cardiologist. Dr Kingston 7 years ago. This morning she also noticed mild vague chest pain, sharp and intermittent, so decided to come to the ED but pain resolved by the time she presented and did not reoccur.     In the ED pt was well appearing,saturating well on room air non-labored breathing at rest but CXR showed diffuse b/l infiltrates to mid/upper lung BNP 2.3K Trops mildly elevated x2 but no ischemic changes on EKG. PT was diuresed in ED and reported good symptom relief thereafter, will be admitted to [facility] for continued treatment and echocardiogram workup of acute heart failure exacerbation  which showed EF 25-30%.     Pt improved with diuretics. Has symptomatic afib  which is possibly worsened by undiagnosed sleep apnea. Pt has poor cardio f/u, last seen in 7 years. Stable for d/c today with pulm, cardio and pmd f/u. History of Present Illness:   56-year-old female no past medical history (does not follow with any doctors) presenting with shortness of breath that has been gradually worsening over the last few weeks, two the point where she now becomes out of breath after taking only a few steps. Also endorses frequent nighttime awakenings with need to prop her head up on pillows during this time frame. Also endorses lower extremity swelling. She is not aware of any heart conditions but admits she once saw a cardiologist. Dr Kingston 7 years ago. This morning she also noticed mild vague chest pain, sharp and intermittent, so decided to come to the ED but pain resolved by the time she presented and did not reoccur.     In the ED pt was well appearing,saturating well on room air non-labored breathing at rest but CXR showed diffuse b/l infiltrates to mid/upper lung BNP 2.3K Trops mildly elevated x2 but no ischemic changes on EKG. PT was diuresed in ED and reported good symptom relief thereafter, will be admitted to [facility] for continued treatment and echocardiogram workup of acute heart failure exacerbation  which showed EF 25-30%.     Pt improved with diuretics. Has symptomatic afib  which is possibly worsened by undiagnosed sleep apnea, s/p ________. Pt has poor cardio f/u, last seen in 7 years. Stable for d/c today with pulm, cardio and pmd f/u.              PLAN:    Cont tele  CE x 2 are negative  ECHO showed EF is 25-30%. Multiple regional wall motion abnormalities.   Cont Lasix 40 mg po q 12h  Check i's and o's and daily wt  Low salt diet and water restriction to 1.5 L/D  Scheduled for CV today  Will d/c diltiazem  Increase Metoprolol to 50 mg po q 12h  Will start her on Entresto 24/26 mg po q 12h.   Will need sleep study as an out pt  Replete Mg. Give Magnesium sulfate 2 gm iv x 1 dose.   Monitor electrolytes.   LDL is 141. Check TSH level     56-year-old female PMHx afib off meds (saw cardiologist 7 yrs ago, never f/u) presenting with shortness of breath that has been gradually worsening over the last few weeks, two the point where she now becomes out of breath after taking only a few steps. Also endorses frequent nighttime awakenings with need to prop her head up on pillows during this time frame. Also endorses lower extremity swelling. She is not aware of any heart conditions but admits she once saw a cardiologist. Dr Kingston 7 years ago. This morning she also noticed mild vague chest pain, sharp and intermittent, so decided to come to the ED but pain resolved by the time she presented and did not reoccur.     In the ED pt was well appearing,saturating well on room air non-labored breathing at rest but CXR showed diffuse b/l infiltrates to mid/upper lung BNP 2.3K Trops mildly elevated x2 but no ischemic changes on EKG. PT was diuresed in ED and reported good symptom relief thereafter. admitted to [facility] for continued treatment and echocardiogram workup of acute heart failure exacerbation which showed EF 25-30%.     Pt improved with diuretics. Underwent failed CV x2. Started on amio gtt. Stable for d/c today with pulm, cardio and pmd f/u (referrrals given). Was going to be d/c on eliquis but insurance does not accept it so being dc on xarelto 20mg po qd, as well as amio po.

## 2022-06-20 NOTE — DISCHARGE NOTE PROVIDER - CARE PROVIDERS DIRECT ADDRESSES
,DirectAddress_Unknown,[email or web address],DirectAddress_Unknown

## 2022-06-21 ENCOUNTER — TRANSCRIPTION ENCOUNTER (OUTPATIENT)
Age: 57
End: 2022-06-21

## 2022-06-21 VITALS
SYSTOLIC BLOOD PRESSURE: 128 MMHG | TEMPERATURE: 97 F | HEART RATE: 94 BPM | DIASTOLIC BLOOD PRESSURE: 81 MMHG | RESPIRATION RATE: 18 BRPM

## 2022-06-21 LAB
ALBUMIN SERPL ELPH-MCNC: 4.5 G/DL — SIGNIFICANT CHANGE UP (ref 3.5–5.2)
ALP SERPL-CCNC: 80 U/L — SIGNIFICANT CHANGE UP (ref 30–115)
ALT FLD-CCNC: 22 U/L — SIGNIFICANT CHANGE UP (ref 0–41)
ANION GAP SERPL CALC-SCNC: 12 MMOL/L — SIGNIFICANT CHANGE UP (ref 7–14)
AST SERPL-CCNC: 20 U/L — SIGNIFICANT CHANGE UP (ref 0–41)
BASOPHILS # BLD AUTO: 0.04 K/UL — SIGNIFICANT CHANGE UP (ref 0–0.2)
BASOPHILS NFR BLD AUTO: 0.5 % — SIGNIFICANT CHANGE UP (ref 0–1)
BILIRUB SERPL-MCNC: 0.6 MG/DL — SIGNIFICANT CHANGE UP (ref 0.2–1.2)
BUN SERPL-MCNC: 12 MG/DL — SIGNIFICANT CHANGE UP (ref 10–20)
CALCIUM SERPL-MCNC: 9.8 MG/DL — SIGNIFICANT CHANGE UP (ref 8.5–10.1)
CHLORIDE SERPL-SCNC: 96 MMOL/L — LOW (ref 98–110)
CO2 SERPL-SCNC: 29 MMOL/L — SIGNIFICANT CHANGE UP (ref 17–32)
CREAT SERPL-MCNC: 0.9 MG/DL — SIGNIFICANT CHANGE UP (ref 0.7–1.5)
EGFR: 75 ML/MIN/1.73M2 — SIGNIFICANT CHANGE UP
EOSINOPHIL # BLD AUTO: 0.1 K/UL — SIGNIFICANT CHANGE UP (ref 0–0.7)
EOSINOPHIL NFR BLD AUTO: 1.3 % — SIGNIFICANT CHANGE UP (ref 0–8)
GLUCOSE SERPL-MCNC: 125 MG/DL — HIGH (ref 70–99)
HCT VFR BLD CALC: 38.9 % — SIGNIFICANT CHANGE UP (ref 37–47)
HGB BLD-MCNC: 13.2 G/DL — SIGNIFICANT CHANGE UP (ref 12–16)
IMM GRANULOCYTES NFR BLD AUTO: 0.3 % — SIGNIFICANT CHANGE UP (ref 0.1–0.3)
LYMPHOCYTES # BLD AUTO: 1.75 K/UL — SIGNIFICANT CHANGE UP (ref 1.2–3.4)
LYMPHOCYTES # BLD AUTO: 22 % — SIGNIFICANT CHANGE UP (ref 20.5–51.1)
MAGNESIUM SERPL-MCNC: 1.7 MG/DL — LOW (ref 1.8–2.4)
MCHC RBC-ENTMCNC: 33.2 PG — HIGH (ref 27–31)
MCHC RBC-ENTMCNC: 33.9 G/DL — SIGNIFICANT CHANGE UP (ref 32–37)
MCV RBC AUTO: 97.7 FL — SIGNIFICANT CHANGE UP (ref 81–99)
MONOCYTES # BLD AUTO: 0.55 K/UL — SIGNIFICANT CHANGE UP (ref 0.1–0.6)
MONOCYTES NFR BLD AUTO: 6.9 % — SIGNIFICANT CHANGE UP (ref 1.7–9.3)
NEUTROPHILS # BLD AUTO: 5.48 K/UL — SIGNIFICANT CHANGE UP (ref 1.4–6.5)
NEUTROPHILS NFR BLD AUTO: 69 % — SIGNIFICANT CHANGE UP (ref 42.2–75.2)
NRBC # BLD: 0 /100 WBCS — SIGNIFICANT CHANGE UP (ref 0–0)
PLATELET # BLD AUTO: 304 K/UL — SIGNIFICANT CHANGE UP (ref 130–400)
POTASSIUM SERPL-MCNC: 3.6 MMOL/L — SIGNIFICANT CHANGE UP (ref 3.5–5)
POTASSIUM SERPL-SCNC: 3.6 MMOL/L — SIGNIFICANT CHANGE UP (ref 3.5–5)
PROT SERPL-MCNC: 6.9 G/DL — SIGNIFICANT CHANGE UP (ref 6–8)
RBC # BLD: 3.98 M/UL — LOW (ref 4.2–5.4)
RBC # FLD: 14.4 % — SIGNIFICANT CHANGE UP (ref 11.5–14.5)
SODIUM SERPL-SCNC: 137 MMOL/L — SIGNIFICANT CHANGE UP (ref 135–146)
WBC # BLD: 7.94 K/UL — SIGNIFICANT CHANGE UP (ref 4.8–10.8)
WBC # FLD AUTO: 7.94 K/UL — SIGNIFICANT CHANGE UP (ref 4.8–10.8)

## 2022-06-21 PROCEDURE — 99233 SBSQ HOSP IP/OBS HIGH 50: CPT

## 2022-06-21 RX ORDER — ALBUTEROL 90 UG/1
0 AEROSOL, METERED ORAL
Qty: 0 | Refills: 0 | DISCHARGE

## 2022-06-21 RX ORDER — MAGNESIUM SULFATE 500 MG/ML
2 VIAL (ML) INJECTION ONCE
Refills: 0 | Status: COMPLETED | OUTPATIENT
Start: 2022-06-21 | End: 2022-06-21

## 2022-06-21 RX ORDER — LOSARTAN POTASSIUM 100 MG/1
25 TABLET, FILM COATED ORAL DAILY
Refills: 0 | Status: DISCONTINUED | OUTPATIENT
Start: 2022-06-21 | End: 2022-06-21

## 2022-06-21 RX ORDER — METOPROLOL TARTRATE 50 MG
1 TABLET ORAL
Qty: 60 | Refills: 0
Start: 2022-06-21 | End: 2022-07-20

## 2022-06-21 RX ORDER — APIXABAN 2.5 MG/1
1 TABLET, FILM COATED ORAL
Qty: 60 | Refills: 3
Start: 2022-06-21 | End: 2022-10-18

## 2022-06-21 RX ORDER — AMIODARONE HYDROCHLORIDE 400 MG/1
200 TABLET ORAL
Refills: 0 | Status: DISCONTINUED | OUTPATIENT
Start: 2022-06-21 | End: 2022-06-21

## 2022-06-21 RX ORDER — RIVAROXABAN 15 MG-20MG
1 KIT ORAL
Qty: 30 | Refills: 0
Start: 2022-06-21 | End: 2022-07-20

## 2022-06-21 RX ORDER — FUROSEMIDE 40 MG
1 TABLET ORAL
Qty: 0 | Refills: 0 | DISCHARGE
Start: 2022-06-21

## 2022-06-21 RX ORDER — LOSARTAN POTASSIUM 100 MG/1
1 TABLET, FILM COATED ORAL
Qty: 30 | Refills: 1
Start: 2022-06-21 | End: 2022-08-19

## 2022-06-21 RX ORDER — METOPROLOL TARTRATE 50 MG
0 TABLET ORAL
Qty: 0 | Refills: 3 | DISCHARGE

## 2022-06-21 RX ORDER — RIVAROXABAN 15 MG-20MG
20 KIT ORAL
Refills: 0 | Status: DISCONTINUED | OUTPATIENT
Start: 2022-06-21 | End: 2022-06-21

## 2022-06-21 RX ORDER — METOPROLOL TARTRATE 50 MG
1 TABLET ORAL
Qty: 0 | Refills: 0 | DISCHARGE
Start: 2022-06-21

## 2022-06-21 RX ORDER — AMIODARONE HYDROCHLORIDE 400 MG/1
1 TABLET ORAL
Qty: 14 | Refills: 0
Start: 2022-06-21 | End: 2022-06-27

## 2022-06-21 RX ORDER — APIXABAN 2.5 MG/1
5 TABLET, FILM COATED ORAL EVERY 12 HOURS
Refills: 0 | Status: DISCONTINUED | OUTPATIENT
Start: 2022-06-21 | End: 2022-06-21

## 2022-06-21 RX ADMIN — AMIODARONE HYDROCHLORIDE 200 MILLIGRAM(S): 400 TABLET ORAL at 13:58

## 2022-06-21 RX ADMIN — ENOXAPARIN SODIUM 115 MILLIGRAM(S): 100 INJECTION SUBCUTANEOUS at 05:37

## 2022-06-21 RX ADMIN — Medication 25 GRAM(S): at 15:11

## 2022-06-21 RX ADMIN — MAGNESIUM OXIDE 400 MG ORAL TABLET 400 MILLIGRAM(S): 241.3 TABLET ORAL at 08:57

## 2022-06-21 RX ADMIN — MAGNESIUM OXIDE 400 MG ORAL TABLET 400 MILLIGRAM(S): 241.3 TABLET ORAL at 11:34

## 2022-06-21 RX ADMIN — Medication 40 MILLIGRAM(S): at 05:38

## 2022-06-21 RX ADMIN — Medication 40 MILLIGRAM(S): at 13:58

## 2022-06-21 RX ADMIN — Medication 100 MILLIGRAM(S): at 05:38

## 2022-06-21 NOTE — PROGRESS NOTE ADULT - SUBJECTIVE AND OBJECTIVE BOX
JYOTHI PLASCENCIA  56y Female    CHIEF COMPLAINT:    Patient is a 56y old  Female who presents with a chief complaint of palpitations (2022 14:15)      INTERVAL HPI/OVERNIGHT EVENTS:    Patient seen and examined. Denies any palpitations. No sob. No cp    ROS: All other systems are negative.    Vital Signs:    T(F): 97.8 (22 @ 04:10), Max: 98.7 (22 @ 21:35)  HR: 74 (22 @ 11:35) (74 - 118)  BP: 126/79 (22 @ 11:35) (96/66 - 143/64)  RR: 18 (22 @ 04:10) (18 - 18)  SpO2: --  I&O's Summary    2022 07:01  -  2022 07:00  --------------------------------------------------------  IN: 350.1 mL / OUT: 0 mL / NET: 350.1 mL      Daily     Daily Weight in k.4 (2022 04:10)  CAPILLARY BLOOD GLUCOSE          PHYSICAL EXAM:    GENERAL:  NAD  SKIN: No rashes or lesions  HENT: Atraumatic. Normocephalic. PERRL. Moist membranes.  NECK: Supple, No JVD. No lymphadenopathy.  PULMONARY: CTA B/L. No wheezing. No rales  CVS: Normal S1, S2. Rate and Rhythm are IRIR. No murmurs.  ABDOMEN/GI: Soft, Nontender, Nondistended; BS present  EXTREMITIES: Peripheral pulses intact. No edema B/L LE.  NEUROLOGIC:  No motor or sensory deficit.  PSYCH: Alert & oriented x 3    Consultant(s) Notes Reviewed:  [x ] YES  [ ] NO  Care Discussed with Consultants/Other Providers [ x] YES  [ ] NO    EKG reviewed  Telemetry reviewed    LABS:                        13.2   7.94  )-----------( 304      ( 2022 06:17 )             38.9     06-21    137  |  96<L>  |  12  ----------------------------<  125<H>  3.6   |  29  |  0.9    Ca    9.8      2022 06:17  Mg     1.7         TPro  6.9  /  Alb  4.5  /  TBili  0.6  /  DBili  x   /  AST  20  /  ALT  22  /  AlkPhos  80        Serum Pro-Brain Natriuretic Peptide: 2340 pg/mL (22 @ 10:19)          RADIOLOGY & ADDITIONAL TESTS:      Imaging or report Personally Reviewed:  [ ] YES  [ ] NO    Medications:  Standing  aMIOdarone    Tablet 200 milliGRAM(s) Oral two times a day  aMIOdarone Infusion 0.5 mG/Min IV Continuous <Continuous>  enoxaparin Injectable 115 milliGRAM(s) SubCutaneous every 12 hours  furosemide    Tablet 40 milliGRAM(s) Oral two times a day  magnesium oxide 400 milliGRAM(s) Oral three times a day with meals  metoprolol tartrate 100 milliGRAM(s) Oral two times a day    PRN Meds  ALBUTerol    90 MICROgram(s) HFA Inhaler 2 Puff(s) Inhalation every 6 hours PRN      Case discussed with resident    Care discussed with pt/family

## 2022-06-21 NOTE — PROGRESS NOTE ADULT - ASSESSMENT
#CHF exacerbation, acute, ejection fraction unknown  - s/p diuresis in ED with improvement in dyspnea  - Cont Lasix 40 mg po q 12h  - d/c diltiazem  -  Metoprolol to 50 mg po q 12h, Entresto 24/26 mg po q 12h.   - Check i's and o's and daily wt  - Low salt diet and water restriction to 1.5 L/D  - med-tele monitoring, strict ins/outs, 1.5L fluid restriction  - ECHO showed EF is 25-30%. Multiple regional wall motion abnormalities.     # Afib  - keep tele  - CE x 2 are negative  - Failed CV 6/20  - C/w amio gtt, switching to po today  - LDL is 141    #b/l lower extremity edema  - etiology most likely CHF exasp but given recent international travel, obesity cannot rule out DVT  - LE duplex ordered  - prophylactic Lovenox for now    # Suspected JAY  - Will need sleep study as an out pt      #HTN  - c/w home metoprolol ER 25mg    #Obesity  - weight loss and exercise counselling  - DASH/ CC diet    #Hypomagnesemia on admission  - Repleted in ED  - f/u am level    DVT ppx: hep sq  GI ppx: None   Diet: DASH 1.5L restriction   Activity: IAT   Dispo: From home, possible d/c today 56y old  Female who presents with a chief complaint of CHF exacerbation and palpitations due to afib with rvr     #HFrEF 25%   - s/p diuresis in ED with improvement in dyspnea  - Cont Lasix 40 mg po q 12h  - d/c diltiazem  -  Metoprolol to 100 mg po q 12h. Holding Entresto 24/26 mg po q 12h.   - Check i's and o's and daily wt  - Low salt diet and water restriction to 1.5 L/D  - med-tele monitoring, strict ins/outs, 1.5L fluid restriction  - ECHO showed EF is 25-30%. Multiple regional wall motion abnormalities.     # Afib  - keep tele  - CE x 2 are negative  - Failed CV x2 on 6/20  - C/w amio gtt, switching to po today  - LDL is 141, TSH still pending     #b/l lower extremity edema  - etiology most likely CHF exasp but given recent international travel, obesity cannot rule out DVT  - B/l LE duplex: No DVT    - S/p therapeutic Lovenox  - Start eliquis 5mg bid     # Suspected JAY  - Will need sleep study as an out pt (pt is aware)     #HTN  - c/w home metoprolol 100mg bid mg    #Obesity  - weight loss and exercise counselling  - DASH/ CC diet    #Hypomagnesemia on admission  - Repleted in ED  - f/u am level    DVT ppx: Eliquis   GI ppx: None   Diet: DASH 1.5L restriction   Activity: IAT   Dispo: From home, possible d/c today, pending cardio f/u

## 2022-06-21 NOTE — PROGRESS NOTE ADULT - SUBJECTIVE AND OBJECTIVE BOX
Hospital Day:  4d    Subjective:    Patient is a 56y old  Female who presents with a chief complaint of palpitations (19 Jun 2022 14:15)      Admitted to medicine for a primary diagnosis of     Past Medical Hx:   No pertinent past medical history      Past Sx:  No significant past surgical history      Allergies:  No Known Allergies    Current Meds:   Standng Meds:  aMIOdarone    Tablet 200 milliGRAM(s) Oral two times a day  aMIOdarone Infusion 0.5 mG/Min (16.7 mL/Hr) IV Continuous <Continuous>  enoxaparin Injectable 115 milliGRAM(s) SubCutaneous every 12 hours  furosemide    Tablet 40 milliGRAM(s) Oral two times a day  magnesium oxide 400 milliGRAM(s) Oral three times a day with meals  metoprolol tartrate 100 milliGRAM(s) Oral two times a day    PRN Meds:  ALBUTerol    90 MICROgram(s) HFA Inhaler 2 Puff(s) Inhalation every 6 hours PRN Wheezing    HOME MEDICATIONS:  ALBUTEROL HFA 90 MCG INHALER: 2 PUFFS AS NEEDED BY INHALATION EVERY 6 HRS FOR 7 DAYS  METOPROLOL TARTRATE 25 MG TAB: TAKE 1 TABLET BY MOUTH TWICE A DAY      Vital Signs:   T(F): 97.8 (06-21-22 @ 04:10), Max: 98.7 (06-20-22 @ 21:35)  HR: 74 (06-21-22 @ 11:35) (74 - 118)  BP: 126/79 (06-21-22 @ 11:35) (96/66 - 143/64)  RR: 18 (06-21-22 @ 04:10) (18 - 18)  SpO2: --      06-20-22 @ 07:01  -  06-21-22 @ 07:00  --------------------------------------------------------  IN: 350.1 mL / OUT: 0 mL / NET: 350.1 mL        Physical Exam:   GENERAL: NAD  HEENT: NCAT  CHEST/LUNG: CTAB  HEART: Regular rate and rhythm; s1 s2 appreciated, No murmurs, rubs, or gallops  ABDOMEN: Soft, Nontender, Nondistended; Bowel sounds present  EXTREMITIES: No LE edema b/l  SKIN: no rashes, no new lesions  NERVOUS SYSTEM:  Alert & Oriented X3  LINES/CATHETERS:        Labs:                         13.2   7.94  )-----------( 304      ( 21 Jun 2022 06:17 )             38.9     Neutophil% 69.0, Lymphocyte% 22.0, Monocyte% 6.9, Bands% 0.3 06-21-22 @ 06:17    21 Jun 2022 06:17    137    |  96     |  12     ----------------------------<  125    3.6     |  29     |  0.9      Ca    9.8        21 Jun 2022 06:17  Mg     1.7       21 Jun 2022 06:17    TPro  6.9    /  Alb  4.5    /  TBili  0.6    /  DBili  x      /  AST  20     /  ALT  22     /  AlkPhos  80     21 Jun 2022 06:17            Serum Pro-Brain Natriuretic Peptide: 2340 pg/mL (06-17-22 @ 10:19)    Troponin <0.01, CKMB --, CK -- 06-18-22 @ 11:36  Troponin 0.01, CKMB --, CK -- 06-17-22 @ 16:03                 Hospital Day:  4d    Subjective:    Patient is a 56y old  Female who presents with a chief complaint of palpitations. No overnight events. In no distress this am. Denies any physical complaints.       Admitted to medicine for a primary diagnosis of CHF exacerbation     Past Medical Hx:   No pertinent past medical history      Past Sx:  No significant past surgical history      Allergies:  No Known Allergies    Current Meds:   Standng Meds:  aMIOdarone    Tablet 200 milliGRAM(s) Oral two times a day  aMIOdarone Infusion 0.5 mG/Min (16.7 mL/Hr) IV Continuous <Continuous>  enoxaparin Injectable 115 milliGRAM(s) SubCutaneous every 12 hours  furosemide    Tablet 40 milliGRAM(s) Oral two times a day  magnesium oxide 400 milliGRAM(s) Oral three times a day with meals  metoprolol tartrate 100 milliGRAM(s) Oral two times a day    PRN Meds:  ALBUTerol    90 MICROgram(s) HFA Inhaler 2 Puff(s) Inhalation every 6 hours PRN Wheezing    HOME MEDICATIONS:  ALBUTEROL HFA 90 MCG INHALER: 2 PUFFS AS NEEDED BY INHALATION EVERY 6 HRS FOR 7 DAYS  METOPROLOL TARTRATE 25 MG TAB: TAKE 1 TABLET BY MOUTH TWICE A DAY      Vital Signs:   T(F): 97.8 (06-21-22 @ 04:10), Max: 98.7 (06-20-22 @ 21:35)  HR: 74 (06-21-22 @ 11:35) (74 - 118)  BP: 126/79 (06-21-22 @ 11:35) (96/66 - 143/64)  RR: 18 (06-21-22 @ 04:10) (18 - 18)  SpO2: --      06-20-22 @ 07:01  -  06-21-22 @ 07:00  --------------------------------------------------------  IN: 350.1 mL / OUT: 0 mL / NET: 350.1 mL        Physical Exam:   GENERAL: NAD  HEENT: NCAT  CHEST/LUNG: CTAB  HEART: Regular rate and rhythm; s1 s2 appreciated, No murmurs, rubs, or gallops  ABDOMEN: Soft, Nontender, Nondistended; Bowel sounds present  EXTREMITIES: trace LE edema b/l  SKIN: no rashes, no new lesions  NERVOUS SYSTEM:  Alert & Oriented X3          Labs:                         13.2   7.94  )-----------( 304      ( 21 Jun 2022 06:17 )             38.9     Neutophil% 69.0, Lymphocyte% 22.0, Monocyte% 6.9, Bands% 0.3 06-21-22 @ 06:17    21 Jun 2022 06:17    137    |  96     |  12     ----------------------------<  125    3.6     |  29     |  0.9      Ca    9.8        21 Jun 2022 06:17  Mg     1.7       21 Jun 2022 06:17    TPro  6.9    /  Alb  4.5    /  TBili  0.6    /  DBili  x      /  AST  20     /  ALT  22     /  AlkPhos  80     21 Jun 2022 06:17            Serum Pro-Brain Natriuretic Peptide: 2340 pg/mL (06-17-22 @ 10:19)    Troponin <0.01, CKMB --, CK -- 06-18-22 @ 11:36  Troponin 0.01, CKMB --, CK -- 06-17-22 @ 16:03

## 2022-06-21 NOTE — PROGRESS NOTE ADULT - ASSESSMENT
56-year-old female no past medical history (does not follow with any doctors) presenting with shortness of breath that has been gradually worsening over the last few weeks, to the point where she now becomes out of breath after taking only a few steps.      Acute HFpEF  PAF  Obesity / Likely JAY  DL             PLAN:    ·	Remains in A-Fib. Rate is controlled.   ·	CE x 2 are negative  ·	ECHO showed EF is 25-30%. Multiple regional wall motion abnormalities.   ·	Switch her to Torsemide 20 mg po daily  ·	Check i's and o's and daily wt  ·	Low salt diet and water restriction to 1.5 L/D  ·	Failed CV yesterday and was started on Amiodarone drip.   ·	Increase Metoprolol to 50 mg po q 12h  ·	Will start her on Entresto 24/26 mg po q 12h.   ·	Will need sleep study as an out pt  ·	Replete Mg. Give Magnesium sulfate 2 gm iv x 1 dose.   ·	Monitor electrolytes.   ·	LDL is 141. Check TSH level    ADDENDUM:    CV x 2 unsuccessful. Cardiology recommended to start her on Amiodarone drip. Hold Entresto. Increase Metoprolol to 100 mg po q 12h.    Progress Note Handoff    Pending (specify):  Consults_________, Tests________, Test Results_______, Other_ unsuccessful CV. On Amiodarone drip. ________  Family discussion:  Disposition: Home___/SNF___/Other________/Unknown at this time________    Matt Hartman MD  Spectra: [phone]     56-year-old female no past medical history (does not follow with any doctors) presenting with shortness of breath that has been gradually worsening over the last few weeks, to the point where she now becomes out of breath after taking only a few steps.      Acute HFpEF  Persistent A-Fib  Obesity / Likely JAY  DL             PLAN:    ·	Remains in A-Fib. Rate is controlled.   ·	CE x 2 are negative  ·	ECHO showed EF is 25-30%. Multiple regional wall motion abnormalities.   ·	Switch her to Torsemide 20 mg po daily  ·	Check i's and o's and daily wt  ·	Low salt diet and water restriction to 1.5 L/D  ·	Failed CV yesterday and was started on Amiodarone drip.   ·	Switch her to Amiodarone 200 mg po q 12h for one week, then 200 mg po daily  ·	Will cont Metoprolol 100 mg po q 12h  ·	Will start her Losartan 25 mg po daily  ·	Will need sleep study as an out pt  ·	Replete Mg. Give Magnesium sulfate 2 gm iv x 1 dose.   ·	Care d/w the cardiology. Will d/c her home today        * Med rec reviewed. Plan of care d/w the pt. Time spent 35 minutes.

## 2022-06-21 NOTE — DISCHARGE NOTE NURSING/CASE MANAGEMENT/SOCIAL WORK - NSDCPEFALRISK_GEN_ALL_CORE
For information on Fall & Injury Prevention, visit: [email or web address] OR  [email or web address] OR  https://www.cdc.gov/steadi/patient.html

## 2022-06-21 NOTE — DISCHARGE NOTE NURSING/CASE MANAGEMENT/SOCIAL WORK - PATIENT PORTAL LINK FT
You can access the FollowMyHealth Patient Portal offered by [facility] by registering at the following website: [email or web address]. By joining [employer]’s FollowMyHealth portal, you will also be able to view your health information using other applications (apps) compatible with our system.

## 2022-06-26 DIAGNOSIS — R73.9 HYPERGLYCEMIA, UNSPECIFIED: ICD-10-CM

## 2022-06-26 DIAGNOSIS — R00.0 TACHYCARDIA, UNSPECIFIED: ICD-10-CM

## 2022-06-26 DIAGNOSIS — I50.31 ACUTE DIASTOLIC (CONGESTIVE) HEART FAILURE: ICD-10-CM

## 2022-06-26 DIAGNOSIS — G47.33 OBSTRUCTIVE SLEEP APNEA (ADULT) (PEDIATRIC): ICD-10-CM

## 2022-06-26 DIAGNOSIS — I49.3 VENTRICULAR PREMATURE DEPOLARIZATION: ICD-10-CM

## 2022-06-26 DIAGNOSIS — I48.19 OTHER PERSISTENT ATRIAL FIBRILLATION: ICD-10-CM

## 2022-06-26 DIAGNOSIS — E66.9 OBESITY, UNSPECIFIED: ICD-10-CM

## 2022-06-26 DIAGNOSIS — Z87.891 PERSONAL HISTORY OF NICOTINE DEPENDENCE: ICD-10-CM

## 2022-06-26 DIAGNOSIS — Z71.3 DIETARY COUNSELING AND SURVEILLANCE: ICD-10-CM

## 2022-06-26 DIAGNOSIS — I48.92 UNSPECIFIED ATRIAL FLUTTER: ICD-10-CM

## 2022-06-26 DIAGNOSIS — I11.0 HYPERTENSIVE HEART DISEASE WITH HEART FAILURE: ICD-10-CM

## 2022-06-26 DIAGNOSIS — R06.02 SHORTNESS OF BREATH: ICD-10-CM

## 2022-06-26 DIAGNOSIS — R94.31 ABNORMAL ELECTROCARDIOGRAM [ECG] [EKG]: ICD-10-CM

## 2022-06-26 DIAGNOSIS — E83.42 HYPOMAGNESEMIA: ICD-10-CM

## 2022-06-26 DIAGNOSIS — E87.70 FLUID OVERLOAD, UNSPECIFIED: ICD-10-CM

## 2022-06-26 DIAGNOSIS — R77.8 OTHER SPECIFIED ABNORMALITIES OF PLASMA PROTEINS: ICD-10-CM

## 2022-06-29 RX ORDER — AMIODARONE HYDROCHLORIDE 400 MG/1
1 TABLET ORAL
Qty: 30 | Refills: 0
Start: 2022-06-29 | End: 2022-07-28

## 2022-07-18 ENCOUNTER — APPOINTMENT (OUTPATIENT)
Dept: PULMONOLOGY | Facility: CLINIC | Age: 57
End: 2022-07-18

## 2022-07-18 VITALS
BODY MASS INDEX: 35.55 KG/M2 | HEART RATE: 106 BPM | SYSTOLIC BLOOD PRESSURE: 130 MMHG | HEIGHT: 69 IN | OXYGEN SATURATION: 97 % | DIASTOLIC BLOOD PRESSURE: 80 MMHG | WEIGHT: 240 LBS

## 2022-07-18 DIAGNOSIS — Z86.79 PERSONAL HISTORY OF OTHER DISEASES OF THE CIRCULATORY SYSTEM: ICD-10-CM

## 2022-07-18 DIAGNOSIS — I50.20 UNSPECIFIED SYSTOLIC (CONGESTIVE) HEART FAILURE: ICD-10-CM

## 2022-07-18 DIAGNOSIS — Z12.2 ENCOUNTER FOR SCREENING FOR MALIGNANT NEOPLASM OF RESPIRATORY ORGANS: ICD-10-CM

## 2022-07-18 DIAGNOSIS — Z87.891 PERSONAL HISTORY OF NICOTINE DEPENDENCE: ICD-10-CM

## 2022-07-18 PROBLEM — Z00.00 ENCOUNTER FOR PREVENTIVE HEALTH EXAMINATION: Status: ACTIVE | Noted: 2022-07-18

## 2022-07-18 PROCEDURE — 99203 OFFICE O/P NEW LOW 30 MIN: CPT

## 2022-07-18 NOTE — HISTORY OF PRESENT ILLNESS
[TextBox_4] : 56-year-old female who has a history of heart failure with reduced ejection fraction, atrial fibrillation on anticoagulation with Xarelto, recently admitted to the hospital for CHF exacerbation and atrial fibrillation.  She underwent cardioversion twice without success.  She is scheduled for cardioversion tomorrow with her cardiologist.  She has dyspnea on exertion.  She has no cough or phlegm production.  From a sleep standpoint she is known to snore at night, has no witnessed apnea, but has a large neck and Mallampati 3 on physical examination.

## 2022-07-18 NOTE — PHYSICAL EXAM
[No Acute Distress] : no acute distress [III] : Mallampati Class: III [Normal Appearance] : normal appearance [No Neck Mass] : no neck mass [No Murmurs] : no murmurs [No Resp Distress] : no resp distress [Clear to Auscultation Bilaterally] : clear to auscultation bilaterally [No Abnormalities] : no abnormalities [Benign] : benign [Normal Gait] : normal gait [No Clubbing] : no clubbing [No Cyanosis] : no cyanosis [No Edema] : no edema [FROM] : FROM [Normal Color/ Pigmentation] : normal color/ pigmentation [No Focal Deficits] : no focal deficits [Oriented x3] : oriented x3 [Normal Affect] : normal affect [TextBox_11] : LArge neck  [TextBox_54] : Irregular

## 2022-07-19 ENCOUNTER — OUTPATIENT (OUTPATIENT)
Dept: OUTPATIENT SERVICES | Facility: HOSPITAL | Age: 57
LOS: 1 days | Discharge: HOME | End: 2022-07-19

## 2022-07-19 VITALS — WEIGHT: 238.1 LBS | HEIGHT: 59.84 IN

## 2022-07-19 DIAGNOSIS — I25.10 ATHEROSCLEROTIC HEART DISEASE OF NATIVE CORONARY ARTERY WITHOUT ANGINA PECTORIS: ICD-10-CM

## 2022-07-19 PROCEDURE — 93010 ELECTROCARDIOGRAM REPORT: CPT

## 2022-07-19 RX ORDER — ALBUTEROL 90 UG/1
2 AEROSOL, METERED ORAL
Qty: 0 | Refills: 0 | DISCHARGE

## 2022-07-19 NOTE — H&P CARDIOLOGY - HISTORY OF PRESENT ILLNESS
57yo F hx of Afib, HFrEF, recent unsuccessful DIANNE/Cardioversion present for repeat CV. Patient has no current complaints.

## 2022-07-19 NOTE — ASU PATIENT PROFILE, ADULT - NSICDXPASTMEDICALHX_GEN_ALL_CORE_FT
PAST MEDICAL HISTORY:  Chronic atrial fibrillation     H/O cardiomyopathy     HTN (hypertension)

## 2022-07-19 NOTE — CHART NOTE - NSCHARTNOTEFT_GEN_A_CORE
POST OPERATIVE PROCEDURAL DOCUMENTATION  PRE-OP DIAGNOSIS:  AF    POST-OP DIAGNOSIS:  AF s/p successful CV    PROCEDURE: Transesophageal echocardiogram    Primary Physician:  Dr. Nascimento  Assistant: Dr. Case    ANESTHESIA TYPE  [  ] General Anesthesia  [ x ] Conscious Sedation  [  ] Local/Regional    CONDITION  [  ] Critical  [  ] Serious  [  ] Fair  [ x ] Good    SPECIMENS REMOVED (IF APPLICABLE): N/A    IMPLANTS (IF APPLICABLE): None    ESTIMATED BLOOD LOSS: None    COMPLICATIONS: None      FINDINGS:    After risks and benefits of procedures were explained, informed consent was obtained and placed in chart. Refer to Anesthesia note for sedation details.  The DIANNE probe was passed into the esophagus without difficulty.  Transesophageal and transgastric images were obtained.  The DIANNE probe was removed without difficulty and examined.  There was no evidence for bleeding.  The patient tolerated the procedure well without any immediate DIANNE-related complications.      Preliminary Findings:  LA:  dilated  ÓSCAR: Left atrial appendage was clear of clot and smoke. Borderline low ÓSCAR velocity  LV: LVEF 40-45%  MV: mild MR, no evidence of MS.   Aorta:  simple atheroma of aortic arch / desc aorta    Patient successfully converted to sinus rhythm with synchronized  200 J of direct current cardioversion.    PLAN OF CARE:  C/w current cardiac regimen  f/u with cardiologist

## 2022-07-19 NOTE — ASU PATIENT PROFILE, ADULT - FALL HARM RISK - UNIVERSAL INTERVENTIONS
Bed in lowest position, wheels locked, appropriate side rails in place/Call bell, personal items and telephone in reach/Instruct patient to call for assistance before getting out of bed or chair/Non-slip footwear when patient is out of bed/Tell to call system/Physically safe environment - no spills, clutter or unnecessary equipment/Purposeful Proactive Rounding/Room/bathroom lighting operational, light cord in reach

## 2022-07-29 PROBLEM — I48.20 CHRONIC ATRIAL FIBRILLATION, UNSPECIFIED: Chronic | Status: ACTIVE | Noted: 2022-07-19

## 2022-07-29 PROBLEM — I10 ESSENTIAL (PRIMARY) HYPERTENSION: Chronic | Status: ACTIVE | Noted: 2022-07-19

## 2022-07-29 PROBLEM — Z86.79 PERSONAL HISTORY OF OTHER DISEASES OF THE CIRCULATORY SYSTEM: Chronic | Status: ACTIVE | Noted: 2022-07-19

## 2022-08-08 ENCOUNTER — OUTPATIENT (OUTPATIENT)
Dept: OUTPATIENT SERVICES | Facility: HOSPITAL | Age: 57
LOS: 1 days | Discharge: HOME | End: 2022-08-08

## 2022-08-08 ENCOUNTER — RESULT REVIEW (OUTPATIENT)
Age: 57
End: 2022-08-08

## 2022-08-08 DIAGNOSIS — Z72.0 TOBACCO USE: ICD-10-CM

## 2022-08-08 PROCEDURE — 71271 CT THORAX LUNG CANCER SCR C-: CPT | Mod: 26

## 2022-10-17 ENCOUNTER — APPOINTMENT (OUTPATIENT)
Dept: PULMONOLOGY | Facility: CLINIC | Age: 57
End: 2022-10-17

## 2022-10-17 VITALS
DIASTOLIC BLOOD PRESSURE: 76 MMHG | RESPIRATION RATE: 16 BRPM | HEIGHT: 68 IN | OXYGEN SATURATION: 98 % | BODY MASS INDEX: 36.37 KG/M2 | WEIGHT: 240 LBS | SYSTOLIC BLOOD PRESSURE: 138 MMHG | HEART RATE: 68 BPM

## 2022-10-17 PROCEDURE — 99213 OFFICE O/P EST LOW 20 MIN: CPT

## 2022-10-17 NOTE — PHYSICAL EXAM
[No Acute Distress] : no acute distress [III] : Mallampati Class: III [Normal Appearance] : normal appearance [No Neck Mass] : no neck mass [No Murmurs] : no murmurs [Clear to Auscultation Bilaterally] : clear to auscultation bilaterally [No Resp Distress] : no resp distress [No Abnormalities] : no abnormalities [Benign] : benign [Normal Gait] : normal gait [No Clubbing] : no clubbing [No Cyanosis] : no cyanosis [No Edema] : no edema [FROM] : FROM [Normal Color/ Pigmentation] : normal color/ pigmentation [No Focal Deficits] : no focal deficits [Oriented x3] : oriented x3 [Normal Affect] : normal affect [TextBox_11] : LArge neck  [TextBox_54] : Irregular

## 2022-10-17 NOTE — HISTORY OF PRESENT ILLNESS
[TextBox_4] : 56-year-old female who has a history of heart failure with reduced ejection fraction, atrial fibrillation on anticoagulation with Xarelto, recently admitted to the hospital for CHF exacerbation and atrial fibrillation.  She underwent cardioversion twice without success.  She is scheduled for cardioversion tomorrow with her cardiologist.  She has dyspnea on exertion.  She has no cough or phlegm production.  From a sleep standpoint she is known to snore at night, has no witnessed apnea, but has a large neck and Mallampati 3 on physical examination.  \par \par 10/17/2022: Sleep study is still pending.  Low-dose CT of the chest reviewed with a 2 mm subpleural right upper lobe lung nodule.  Recommend follow-up in 1 year.  Her CHF symptoms are well under control and she is on p.o. diuretics as needed.  She is also on Xarelto for atrial fibrillation.  Follow-up in 3 months after the sleep study is done.

## 2022-10-17 NOTE — REASON FOR VISIT
[Follow-Up] : a follow-up visit [Abnormal CXR/ Chest CT] : an abnormal CXR/ chest CT [Sleep Evaluation] : sleep evaluation [Sleep Apnea] : sleep apnea

## 2023-01-03 ENCOUNTER — APPOINTMENT (OUTPATIENT)
Dept: PULMONOLOGY | Facility: CLINIC | Age: 58
End: 2023-01-03

## 2023-10-15 NOTE — ED ADULT NURSE NOTE - CCCP TRG CHIEF CMPLNT
Updated MD Josephine regarding increased oxygen demands, wheezy breath sounds, and increased work of breathing requiring non-rebreather to maintain target oxygen saturations.     Intervention: STAT ABG, STAT chest x-ray, initiate HFNC.     MD escalating to Intensive care MD.    chest pressure

## 2023-11-14 ENCOUNTER — APPOINTMENT (OUTPATIENT)
Dept: ELECTROPHYSIOLOGY | Facility: CLINIC | Age: 58
End: 2023-11-14
Payer: COMMERCIAL

## 2023-11-14 VITALS
HEART RATE: 58 BPM | HEIGHT: 69 IN | WEIGHT: 250 LBS | TEMPERATURE: 98 F | DIASTOLIC BLOOD PRESSURE: 72 MMHG | BODY MASS INDEX: 37.03 KG/M2 | SYSTOLIC BLOOD PRESSURE: 126 MMHG

## 2023-11-14 DIAGNOSIS — Z82.49 FAMILY HISTORY OF ISCHEMIC HEART DISEASE AND OTHER DISEASES OF THE CIRCULATORY SYSTEM: ICD-10-CM

## 2023-11-14 DIAGNOSIS — Z78.9 OTHER SPECIFIED HEALTH STATUS: ICD-10-CM

## 2023-11-14 DIAGNOSIS — I48.91 UNSPECIFIED ATRIAL FIBRILLATION: ICD-10-CM

## 2023-11-14 PROCEDURE — 99205 OFFICE O/P NEW HI 60 MIN: CPT | Mod: 25

## 2023-11-14 PROCEDURE — 93000 ELECTROCARDIOGRAM COMPLETE: CPT

## 2023-11-14 RX ORDER — RIVAROXABAN 20 MG/1
20 TABLET, FILM COATED ORAL
Qty: 90 | Refills: 3 | Status: ACTIVE | COMMUNITY

## 2023-12-21 ENCOUNTER — OUTPATIENT (OUTPATIENT)
Dept: OUTPATIENT SERVICES | Facility: HOSPITAL | Age: 58
LOS: 1 days | End: 2023-12-21
Payer: COMMERCIAL

## 2023-12-21 ENCOUNTER — RESULT REVIEW (OUTPATIENT)
Age: 58
End: 2023-12-21

## 2023-12-21 VITALS
HEART RATE: 69 BPM | WEIGHT: 250 LBS | RESPIRATION RATE: 18 BRPM | SYSTOLIC BLOOD PRESSURE: 149 MMHG | OXYGEN SATURATION: 97 % | HEIGHT: 68 IN | DIASTOLIC BLOOD PRESSURE: 77 MMHG | TEMPERATURE: 98 F

## 2023-12-21 DIAGNOSIS — I48.19 OTHER PERSISTENT ATRIAL FIBRILLATION: ICD-10-CM

## 2023-12-21 DIAGNOSIS — Z01.818 ENCOUNTER FOR OTHER PREPROCEDURAL EXAMINATION: ICD-10-CM

## 2023-12-21 DIAGNOSIS — Z98.891 HISTORY OF UTERINE SCAR FROM PREVIOUS SURGERY: Chronic | ICD-10-CM

## 2023-12-21 DIAGNOSIS — Z98.890 OTHER SPECIFIED POSTPROCEDURAL STATES: Chronic | ICD-10-CM

## 2023-12-21 LAB
ALBUMIN SERPL ELPH-MCNC: 4.6 G/DL — SIGNIFICANT CHANGE UP (ref 3.5–5.2)
ALBUMIN SERPL ELPH-MCNC: 4.6 G/DL — SIGNIFICANT CHANGE UP (ref 3.5–5.2)
ALP SERPL-CCNC: 120 U/L — HIGH (ref 30–115)
ALP SERPL-CCNC: 120 U/L — HIGH (ref 30–115)
ALT FLD-CCNC: 21 U/L — SIGNIFICANT CHANGE UP (ref 0–41)
ALT FLD-CCNC: 21 U/L — SIGNIFICANT CHANGE UP (ref 0–41)
ANION GAP SERPL CALC-SCNC: 14 MMOL/L — SIGNIFICANT CHANGE UP (ref 7–14)
ANION GAP SERPL CALC-SCNC: 14 MMOL/L — SIGNIFICANT CHANGE UP (ref 7–14)
AST SERPL-CCNC: 20 U/L — SIGNIFICANT CHANGE UP (ref 0–41)
AST SERPL-CCNC: 20 U/L — SIGNIFICANT CHANGE UP (ref 0–41)
BASOPHILS # BLD AUTO: 0.05 K/UL — SIGNIFICANT CHANGE UP (ref 0–0.2)
BASOPHILS # BLD AUTO: 0.05 K/UL — SIGNIFICANT CHANGE UP (ref 0–0.2)
BASOPHILS NFR BLD AUTO: 0.6 % — SIGNIFICANT CHANGE UP (ref 0–1)
BASOPHILS NFR BLD AUTO: 0.6 % — SIGNIFICANT CHANGE UP (ref 0–1)
BILIRUB SERPL-MCNC: 0.3 MG/DL — SIGNIFICANT CHANGE UP (ref 0.2–1.2)
BILIRUB SERPL-MCNC: 0.3 MG/DL — SIGNIFICANT CHANGE UP (ref 0.2–1.2)
BUN SERPL-MCNC: 14 MG/DL — SIGNIFICANT CHANGE UP (ref 10–20)
BUN SERPL-MCNC: 14 MG/DL — SIGNIFICANT CHANGE UP (ref 10–20)
CALCIUM SERPL-MCNC: 10 MG/DL — SIGNIFICANT CHANGE UP (ref 8.4–10.5)
CALCIUM SERPL-MCNC: 10 MG/DL — SIGNIFICANT CHANGE UP (ref 8.4–10.5)
CHLORIDE SERPL-SCNC: 96 MMOL/L — LOW (ref 98–110)
CHLORIDE SERPL-SCNC: 96 MMOL/L — LOW (ref 98–110)
CO2 SERPL-SCNC: 29 MMOL/L — SIGNIFICANT CHANGE UP (ref 17–32)
CO2 SERPL-SCNC: 29 MMOL/L — SIGNIFICANT CHANGE UP (ref 17–32)
CREAT SERPL-MCNC: 0.9 MG/DL — SIGNIFICANT CHANGE UP (ref 0.7–1.5)
CREAT SERPL-MCNC: 0.9 MG/DL — SIGNIFICANT CHANGE UP (ref 0.7–1.5)
EGFR: 74 ML/MIN/1.73M2 — SIGNIFICANT CHANGE UP
EGFR: 74 ML/MIN/1.73M2 — SIGNIFICANT CHANGE UP
EOSINOPHIL # BLD AUTO: 0.1 K/UL — SIGNIFICANT CHANGE UP (ref 0–0.7)
EOSINOPHIL # BLD AUTO: 0.1 K/UL — SIGNIFICANT CHANGE UP (ref 0–0.7)
EOSINOPHIL NFR BLD AUTO: 1.3 % — SIGNIFICANT CHANGE UP (ref 0–8)
EOSINOPHIL NFR BLD AUTO: 1.3 % — SIGNIFICANT CHANGE UP (ref 0–8)
GLUCOSE SERPL-MCNC: 99 MG/DL — SIGNIFICANT CHANGE UP (ref 70–99)
GLUCOSE SERPL-MCNC: 99 MG/DL — SIGNIFICANT CHANGE UP (ref 70–99)
HCT VFR BLD CALC: 42.6 % — SIGNIFICANT CHANGE UP (ref 37–47)
HCT VFR BLD CALC: 42.6 % — SIGNIFICANT CHANGE UP (ref 37–47)
HGB BLD-MCNC: 13.8 G/DL — SIGNIFICANT CHANGE UP (ref 12–16)
HGB BLD-MCNC: 13.8 G/DL — SIGNIFICANT CHANGE UP (ref 12–16)
IMM GRANULOCYTES NFR BLD AUTO: 0.4 % — HIGH (ref 0.1–0.3)
IMM GRANULOCYTES NFR BLD AUTO: 0.4 % — HIGH (ref 0.1–0.3)
LYMPHOCYTES # BLD AUTO: 1.71 K/UL — SIGNIFICANT CHANGE UP (ref 1.2–3.4)
LYMPHOCYTES # BLD AUTO: 1.71 K/UL — SIGNIFICANT CHANGE UP (ref 1.2–3.4)
LYMPHOCYTES # BLD AUTO: 22 % — SIGNIFICANT CHANGE UP (ref 20.5–51.1)
LYMPHOCYTES # BLD AUTO: 22 % — SIGNIFICANT CHANGE UP (ref 20.5–51.1)
MCHC RBC-ENTMCNC: 32.4 G/DL — SIGNIFICANT CHANGE UP (ref 32–37)
MCHC RBC-ENTMCNC: 32.4 G/DL — SIGNIFICANT CHANGE UP (ref 32–37)
MCHC RBC-ENTMCNC: 32.4 PG — HIGH (ref 27–31)
MCHC RBC-ENTMCNC: 32.4 PG — HIGH (ref 27–31)
MCV RBC AUTO: 100 FL — HIGH (ref 81–99)
MCV RBC AUTO: 100 FL — HIGH (ref 81–99)
MONOCYTES # BLD AUTO: 0.52 K/UL — SIGNIFICANT CHANGE UP (ref 0.1–0.6)
MONOCYTES # BLD AUTO: 0.52 K/UL — SIGNIFICANT CHANGE UP (ref 0.1–0.6)
MONOCYTES NFR BLD AUTO: 6.7 % — SIGNIFICANT CHANGE UP (ref 1.7–9.3)
MONOCYTES NFR BLD AUTO: 6.7 % — SIGNIFICANT CHANGE UP (ref 1.7–9.3)
NEUTROPHILS # BLD AUTO: 5.37 K/UL — SIGNIFICANT CHANGE UP (ref 1.4–6.5)
NEUTROPHILS # BLD AUTO: 5.37 K/UL — SIGNIFICANT CHANGE UP (ref 1.4–6.5)
NEUTROPHILS NFR BLD AUTO: 69 % — SIGNIFICANT CHANGE UP (ref 42.2–75.2)
NEUTROPHILS NFR BLD AUTO: 69 % — SIGNIFICANT CHANGE UP (ref 42.2–75.2)
NRBC # BLD: 0 /100 WBCS — SIGNIFICANT CHANGE UP (ref 0–0)
NRBC # BLD: 0 /100 WBCS — SIGNIFICANT CHANGE UP (ref 0–0)
PLATELET # BLD AUTO: 284 K/UL — SIGNIFICANT CHANGE UP (ref 130–400)
PLATELET # BLD AUTO: 284 K/UL — SIGNIFICANT CHANGE UP (ref 130–400)
PMV BLD: 11.1 FL — HIGH (ref 7.4–10.4)
PMV BLD: 11.1 FL — HIGH (ref 7.4–10.4)
POTASSIUM SERPL-MCNC: 4 MMOL/L — SIGNIFICANT CHANGE UP (ref 3.5–5)
POTASSIUM SERPL-MCNC: 4 MMOL/L — SIGNIFICANT CHANGE UP (ref 3.5–5)
POTASSIUM SERPL-SCNC: 4 MMOL/L — SIGNIFICANT CHANGE UP (ref 3.5–5)
POTASSIUM SERPL-SCNC: 4 MMOL/L — SIGNIFICANT CHANGE UP (ref 3.5–5)
PROT SERPL-MCNC: 7.6 G/DL — SIGNIFICANT CHANGE UP (ref 6–8)
PROT SERPL-MCNC: 7.6 G/DL — SIGNIFICANT CHANGE UP (ref 6–8)
RBC # BLD: 4.26 M/UL — SIGNIFICANT CHANGE UP (ref 4.2–5.4)
RBC # BLD: 4.26 M/UL — SIGNIFICANT CHANGE UP (ref 4.2–5.4)
RBC # FLD: 12.7 % — SIGNIFICANT CHANGE UP (ref 11.5–14.5)
RBC # FLD: 12.7 % — SIGNIFICANT CHANGE UP (ref 11.5–14.5)
SODIUM SERPL-SCNC: 139 MMOL/L — SIGNIFICANT CHANGE UP (ref 135–146)
SODIUM SERPL-SCNC: 139 MMOL/L — SIGNIFICANT CHANGE UP (ref 135–146)
WBC # BLD: 7.78 K/UL — SIGNIFICANT CHANGE UP (ref 4.8–10.8)
WBC # BLD: 7.78 K/UL — SIGNIFICANT CHANGE UP (ref 4.8–10.8)
WBC # FLD AUTO: 7.78 K/UL — SIGNIFICANT CHANGE UP (ref 4.8–10.8)
WBC # FLD AUTO: 7.78 K/UL — SIGNIFICANT CHANGE UP (ref 4.8–10.8)

## 2023-12-21 PROCEDURE — 80053 COMPREHEN METABOLIC PANEL: CPT

## 2023-12-21 PROCEDURE — 36415 COLL VENOUS BLD VENIPUNCTURE: CPT

## 2023-12-21 PROCEDURE — 99214 OFFICE O/P EST MOD 30 MIN: CPT | Mod: 25

## 2023-12-21 PROCEDURE — 86900 BLOOD TYPING SEROLOGIC ABO: CPT

## 2023-12-21 PROCEDURE — 85025 COMPLETE CBC W/AUTO DIFF WBC: CPT

## 2023-12-21 PROCEDURE — 86901 BLOOD TYPING SEROLOGIC RH(D): CPT

## 2023-12-21 PROCEDURE — 71046 X-RAY EXAM CHEST 2 VIEWS: CPT | Mod: 26

## 2023-12-21 PROCEDURE — 71046 X-RAY EXAM CHEST 2 VIEWS: CPT

## 2023-12-21 PROCEDURE — 86850 RBC ANTIBODY SCREEN: CPT

## 2023-12-21 NOTE — H&P PST ADULT - REASON FOR ADMISSION
58 Y/O F with pmhx Hypertension, hyperlipidemia, obesity with BMI of 36.9, cardiomyopathy with EF 40-45%, longstanding paroxysmal atrial fibrillation, converted to persistent atrial fibrillation in 2022.SCHEDULED FOR PAST FOR AF/AFL ABLATION/DEAN/RD UNDER GA WITH DR KUMAR ON 1/11/24. 56 Y/O F with pmhx Hypertension, hyperlipidemia, obesity with BMI of 36.9, cardiomyopathy with EF 40-45%, longstanding paroxysmal atrial fibrillation, converted to persistent atrial fibrillation in 2022.SCHEDULED FOR PAST FOR AF/AFL ABLATION/DIANNE/RD UNDER GA WITH DR KUMAR ON 1/11/24.

## 2023-12-21 NOTE — H&P PST ADULT - HISTORY OF PRESENT ILLNESS
PATIENT CURRENTLY DENIES CHEST PAIN  SHORTNESS OF BREATH  PALPITATIONS,  DYSURIA, OR UPPER RESPIRATORY INFECTION IN PAST 2 WEEKS      Denies travel outside the [place] in the past 30 days  Patient denies any signs or symptoms of COVID 19 and denies contact with known positive individuals.         Anesthesia Alert  NO--Difficult Airway WIDE NECK   NO--History of neck surgery or radiation  NO--Limited ROM of neck  NO--History of Malignant hyperthermia  NO--No personal or family history of Pseudocholinesterase deficiency.  NO--Prior Anesthesia Complication  NO--Latex Allergy  NO--Loose teeth  NO--History of Rheumatoid Arthritis  YES--Bleeding risk XARELTO   NO--JAY  NO--Other_____  CLASS II    Revised Cardiac Risk Index for Pre-Operative Risk     RESULT SUMMARY:  1 points  Class II Risk    6.0 %  30-day risk of death, MI, or cardiac arrest    INPUTS:  Elevated-risk surgery —> 0 = No  History of ischemic heart disease —> 0 = No  History of congestive heart failure —> 1 = Yes  History of cerebrovascular disease —> 0 = No  Pre-operative treatment with insulin —> 0 = No  Pre-operative creatinine >2 mg/dL / 176.8 µmol/L —> 0 = No    [facility] Activity Status Index (DASI)   RESULT SUMMARY:  50.7 points  The higher the score (maximum 58.2), the higher the functional status.    8.97 METs    INPUTS:  Take care of self —> 2.75 = Yes  Walk indoors —> 1.75 = Yes  Walk 1&ndash;2 blocks on level ground —> 2.75 = Yes  Climb a flight of stairs or walk up a hill —> 5.5 = Yes  Run a short distance —> 8 = Yes  Do light work around the house —> 2.7 = Yes  Do moderate work around the house —> 3.5 = Yes  Do heavy work around the house —> 8 = Yes  Do yardwork —> 4.5 = Yes  Have sexual relations —> 5.25 = Yes  Participate in moderate recreational activities —> 6 = Yes  Participate in strenuous sports —> 0 = No        PT DENIES ANY RASHES, ABRASION, OR OPEN WOUNDS OR CUTS    AS PER THE PT, THIS IS HIS/HER COMPLETE MEDICAL AND SURGICAL HX, INCLUDING MEDICATIONS PRESCRIBED AND OVER THE COUNTER    Patient verbalized understanding of instructions and was given the opportunity to ask questions and have them answered.    pt denies any suicidal ideation or thoughts, pt states not a threat to self or others

## 2023-12-21 NOTE — H&P PST ADULT - NSALCOHOLUSECOMMENT_GEN_ALL_CORE_FT
Admitting Physician: INDIA RAM [[id number]]   Transferring Patient to Only adjust for transfers to [facility] unit: [facility] [[id number]]   Medical Necessity Information: It is in your best interest to select a reason for this procedure from the list below. All of these items fulfill various CMS LCD requirements except lesion extends to a margin. Include Z78.9 (Other Specified Conditions Influencing Health Status) As An Associated Diagnosis?: No Medical Necessity Clause: This procedure was medically necessary because the lesion that was treated was: Lab: [facility] Lab Facility: [address] [facility] Body Location Override (Optional - Billing Will Still Be Based On Selected Body Map Location If Applicable): left malar Size Of Lesion In Cm: 1.5 X Size Of Lesion In Cm (Optional): 0.8 Size Of Margin In Cm: 0.2 Anesthesia Volume In Cc: 2.1 Excision Method: Fusiform Repair Type: Intermediate Suturegard Retention Suture: 2-0 Nylon Retention Suture Bite Size: 3 mm Length To Time In Minutes Device Was In Place: 10 Number Of Hemigard Strips Per Side: 1 Undermining Type: Entire Wound Debridement Text: The wound edges were debrided prior to proceeding with the closure to facilitate wound healing. Helical Rim Text: The closure involved the helical rim. Vermilion Border Text: The closure involved the vermilion border. Nostril Rim Text: The closure involved the nostril rim. Retention Suture Text: Retention sutures were placed to support the closure and prevent dehiscence. Primary Defect Length (In Cm): 0 Epidermal Closure Graft Donor Site (Optional): simple interrupted Graft Donor Site Bandage (Optional-Leave Blank If You Don't Want In Note): Steri-strips and a pressure bandage were applied to the donor site. Detail Level: Detailed Excision Depth: adipose tissue Scalpel Size: 15 blade Anesthesia Type: 1% lidocaine with epinephrine Additional Anesthesia Volume In Cc: 6 Hemostasis: Electrocautery Estimated Blood Loss (Cc): minimal Deep Sutures: 5-0 Vicryl Epidermal Sutures: 4-0 Ethilon Wound Care: Petrolatum Dressing: pressure dressing Suturegard Intro: Intraoperative tissue expansion was performed, utilizing the SUTUREGARD device, in order to reduce wound tension. Suturegard Body: The suture ends were repeatedly re-tightened and re-clamped to achieve the desired tissue expansion. Hemigard Intro: Due to skin fragility and wound tension, it was decided to use HEMIGARD adhesive retention suture devices to permit a linear closure. The skin was cleaned and dried for a 6cm distance away from the wound. Excessive hair, if present, was removed to allow for adhesion. Hemigard Postcare Instructions: The HEMIGARD strips are to remain completely dry for at least 5-7 days. Complex Repair Preamble Text (Leave Blank If You Do Not Want): Extensive wide undermining was performed. Intermediate Repair Preamble Text (Leave Blank If You Do Not Want): Undermining was performed with blunt dissection. Fusiform Excision Additional Text (Leave Blank If You Do Not Want): The margin was drawn around the clinically apparent lesion. A fusiform shape was then drawn on the skin incorporating the lesion and margins. Incisions were then made along these lines to the appropriate tissue plane and the lesion was extirpated. Eliptical Excision Additional Text (Leave Blank If You Do Not Want): The margin was drawn around the clinically apparent lesion. An elliptical shape was then drawn on the skin incorporating the lesion and margins. Incisions were then made along these lines to the appropriate tissue plane and the lesion was extirpated. Saucerization Excision Additional Text (Leave Blank If You Do Not Want): The margin was drawn around the clinically apparent lesion. Incisions were then made along these lines, in a tangential fashion, to the appropriate tissue plane and the lesion was extirpated. SOCIAL Slit Excision Additional Text (Leave Blank If You Do Not Want): A linear line was drawn on the skin overlying the lesion. An incision was made slowly until the lesion was visualized. Once visualized, the lesion was removed with blunt dissection. Excisional Biopsy Additional Text (Leave Blank If You Do Not Want): The margin was drawn around the clinically apparent lesion. An elliptical shape was then drawn on the skin incorporating the lesion and margins.  Incisions were then made along these lines to the appropriate tissue plane and the lesion was extirpated. Perilesional Excision Additional Text (Leave Blank If You Do Not Want): The margin was drawn around the clinically apparent lesion. Incisions were then made along these lines to the appropriate tissue plane and the lesion was extirpated. Repair Performed By Another Provider Text (Leave Blank If You Do Not Want): After the tissue was excised the defect was repaired by another provider. No Repair - Repaired With Adjacent Surgical Defect Text (Leave Blank If You Do Not Want): After the excision the defect was repaired concurrently with another surgical defect which was in close approximation. Advancement Flap (Single) Text: The defect edges were debeveled with a #15 scalpel blade. Given the location of the defect and the proximity to free margins a single advancement flap was deemed most appropriate. Using a sterile surgical marker, an appropriate advancement flap was drawn incorporating the defect and placing the expected incisions within the relaxed skin tension lines where possible. The area thus outlined was incised deep to adipose tissue with a #15 scalpel blade. The skin margins were undermined to an appropriate distance in all directions utilizing iris scissors. Advancement Flap (Double) Text: The defect edges were debeveled with a #15 scalpel blade. Given the location of the defect and the proximity to free margins a double advancement flap was deemed most appropriate. Using a sterile surgical marker, the appropriate advancement flaps were drawn incorporating the defect and placing the expected incisions within the relaxed skin tension lines where possible. The area thus outlined was incised deep to adipose tissue with a #15 scalpel blade. The skin margins were undermined to an appropriate distance in all directions utilizing iris scissors. Burow's Advancement Flap Text: The defect edges were debeveled with a #15 scalpel blade. Given the location of the defect and the proximity to free margins a Burow's advancement flap was deemed most appropriate. Using a sterile surgical marker, the appropriate advancement flap was drawn incorporating the defect and placing the expected incisions within the relaxed skin tension lines where possible. The area thus outlined was incised deep to adipose tissue with a #15 scalpel blade. The skin margins were undermined to an appropriate distance in all directions utilizing iris scissors. Chonodrocutaneous Helical Advancement Flap Text: The defect edges were debeveled with a #15 scalpel blade. Given the location of the defect and the proximity to free margins a chondrocutaneous helical advancement flap was deemed most appropriate. Using a sterile surgical marker, the appropriate advancement flap was drawn incorporating the defect and placing the expected incisions within the relaxed skin tension lines where possible. The area thus outlined was incised deep to adipose tissue with a #15 scalpel blade. The skin margins were undermined to an appropriate distance in all directions utilizing iris scissors. Crescentic Advancement Flap Text: The defect edges were debeveled with a #15 scalpel blade. Given the location of the defect and the proximity to free margins a crescentic advancement flap was deemed most appropriate. Using a sterile surgical marker, the appropriate advancement flap was drawn incorporating the defect and placing the expected incisions within the relaxed skin tension lines where possible. The area thus outlined was incised deep to adipose tissue with a #15 scalpel blade. The skin margins were undermined to an appropriate distance in all directions utilizing iris scissors. A-T Advancement Flap Text: The defect edges were debeveled with a #15 scalpel blade. Given the location of the defect, shape of the defect and the proximity to free margins an A-T advancement flap was deemed most appropriate. Using a sterile surgical marker, an appropriate advancement flap was drawn incorporating the defect and placing the expected incisions within the relaxed skin tension lines where possible. The area thus outlined was incised deep to adipose tissue with a #15 scalpel blade. The skin margins were undermined to an appropriate distance in all directions utilizing iris scissors. O-T Advancement Flap Text: The defect edges were debeveled with a #15 scalpel blade. Given the location of the defect, shape of the defect and the proximity to free margins an O-T advancement flap was deemed most appropriate. Using a sterile surgical marker, an appropriate advancement flap was drawn incorporating the defect and placing the expected incisions within the relaxed skin tension lines where possible. The area thus outlined was incised deep to adipose tissue with a #15 scalpel blade. The skin margins were undermined to an appropriate distance in all directions utilizing iris scissors. O-L Flap Text: The defect edges were debeveled with a #15 scalpel blade. Given the location of the defect, shape of the defect and the proximity to free margins an O-L flap was deemed most appropriate. Using a sterile surgical marker, an appropriate advancement flap was drawn incorporating the defect and placing the expected incisions within the relaxed skin tension lines where possible. The area thus outlined was incised deep to adipose tissue with a #15 scalpel blade. The skin margins were undermined to an appropriate distance in all directions utilizing iris scissors. O-Z Flap Text: The defect edges were debeveled with a #15 scalpel blade. Given the location of the defect, shape of the defect and the proximity to free margins an O-Z flap was deemed most appropriate. Using a sterile surgical marker, an appropriate transposition flap was drawn incorporating the defect and placing the expected incisions within the relaxed skin tension lines where possible. The area thus outlined was incised deep to adipose tissue with a #15 scalpel blade. The skin margins were undermined to an appropriate distance in all directions utilizing iris scissors. Double O-Z Flap Text: The defect edges were debeveled with a #15 scalpel blade. Given the location of the defect, shape of the defect and the proximity to free margins a Double O-Z flap was deemed most appropriate. Using a sterile surgical marker, an appropriate transposition flap was drawn incorporating the defect and placing the expected incisions within the relaxed skin tension lines where possible. The area thus outlined was incised deep to adipose tissue with a #15 scalpel blade. The skin margins were undermined to an appropriate distance in all directions utilizing iris scissors. V-Y Flap Text: The defect edges were debeveled with a #15 scalpel blade. Given the location of the defect, shape of the defect and the proximity to free margins a V-Y flap was deemed most appropriate. Using a sterile surgical marker, an appropriate advancement flap was drawn incorporating the defect and placing the expected incisions within the relaxed skin tension lines where possible. The area thus outlined was incised deep to adipose tissue with a #15 scalpel blade. The skin margins were undermined to an appropriate distance in all directions utilizing iris scissors. Mercedes Flap Text: The defect edges were debeveled with a #15 scalpel blade. Given the location of the defect, shape of the defect and the proximity to free margins a Mercedes flap was deemed most appropriate. Using a sterile surgical marker, an appropriate advancement flap was drawn incorporating the defect and placing the expected incisions within the relaxed skin tension lines where possible. The area thus outlined was incised deep to adipose tissue with a #15 scalpel blade. The skin margins were undermined to an appropriate distance in all directions utilizing iris scissors. Modified Advancement Flap Text: The defect edges were debeveled with a #15 scalpel blade. Given the location of the defect, shape of the defect and the proximity to free margins a modified advancement flap was deemed most appropriate. Using a sterile surgical marker, an appropriate advancement flap was drawn incorporating the defect and placing the expected incisions within the relaxed skin tension lines where possible. The area thus outlined was incised deep to adipose tissue with a #15 scalpel blade. The skin margins were undermined to an appropriate distance in all directions utilizing iris scissors. Mucosal Advancement Flap Text: Given the location of the defect, shape of the defect and the proximity to free margins a mucosal advancement flap was deemed most appropriate. Incisions were made with a 15 blade scalpel in the appropriate fashion along the cutaneous vermillion border and the mucosal lip. The remaining actinically damaged mucosal tissue was excised. The mucosal advancement flap was then elevated to the gingival sulcus with care taken to preserve the neurovascular structures and advanced into the primary defect. Care was taken to ensure that precise realignment of the vermilion border was achieved. Hatchet Flap Text: The defect edges were debeveled with a #15 scalpel blade. Given the location of the defect, shape of the defect and the proximity to free margins a hatchet flap was deemed most appropriate. Using a sterile surgical marker, an appropriate hatchet flap was drawn incorporating the defect and placing the expected incisions within the relaxed skin tension lines where possible. The area thus outlined was incised deep to adipose tissue with a #15 scalpel blade. The skin margins were undermined to an appropriate distance in all directions utilizing iris scissors. Rotation Flap Text: The defect edges were debeveled with a #15 scalpel blade. Given the location of the defect, shape of the defect and the proximity to free margins a rotation flap was deemed most appropriate. Using a sterile surgical marker, an appropriate rotation flap was drawn incorporating the defect and placing the expected incisions within the relaxed skin tension lines where possible. The area thus outlined was incised deep to adipose tissue with a #15 scalpel blade. The skin margins were undermined to an appropriate distance in all directions utilizing iris scissors. Spiral Flap Text: The defect edges were debeveled with a #15 scalpel blade. Given the location of the defect, shape of the defect and the proximity to free margins a spiral flap was deemed most appropriate. Using a sterile surgical marker, an appropriate rotation flap was drawn incorporating the defect and placing the expected incisions within the relaxed skin tension lines where possible. The area thus outlined was incised deep to adipose tissue with a #15 scalpel blade. The skin margins were undermined to an appropriate distance in all directions utilizing iris scissors. Star Wedge Flap Text: The defect edges were debeveled with a #15 scalpel blade. Given the location of the defect, shape of the defect and the proximity to free margins a star wedge flap was deemed most appropriate. Using a sterile surgical marker, an appropriate rotation flap was drawn incorporating the defect and placing the expected incisions within the relaxed skin tension lines where possible. The area thus outlined was incised deep to adipose tissue with a #15 scalpel blade. The skin margins were undermined to an appropriate distance in all directions utilizing iris scissors. Transposition Flap Text: The defect edges were debeveled with a #15 scalpel blade. Given the location of the defect and the proximity to free margins a transposition flap was deemed most appropriate. Using a sterile surgical marker, an appropriate transposition flap was drawn incorporating the defect. The area thus outlined was incised deep to adipose tissue with a #15 scalpel blade. The skin margins were undermined to an appropriate distance in all directions utilizing iris scissors. Muscle Hinge Flap Text: The defect edges were debeveled with a #15 scalpel blade. Given the size, depth and location of the defect and the proximity to free margins a muscle hinge flap was deemed most appropriate. Using a sterile surgical marker, an appropriate hinge flap was drawn incorporating the defect. The area thus outlined was incised with a #15 scalpel blade. The skin margins were undermined to an appropriate distance in all directions utilizing iris scissors. Melolabial Transposition Flap Text: The defect edges were debeveled with a #15 scalpel blade. Given the location of the defect and the proximity to free margins a melolabial flap was deemed most appropriate. Using a sterile surgical marker, an appropriate melolabial transposition flap was drawn incorporating the defect. The area thus outlined was incised deep to adipose tissue with a #15 scalpel blade. The skin margins were undermined to an appropriate distance in all directions utilizing iris scissors. Rhombic Flap Text: The defect edges were debeveled with a #15 scalpel blade. Given the location of the defect and the proximity to free margins a rhombic flap was deemed most appropriate. Using a sterile surgical marker, an appropriate rhombic flap was drawn incorporating the defect. The area thus outlined was incised deep to adipose tissue with a #15 scalpel blade. The skin margins were undermined to an appropriate distance in all directions utilizing iris scissors. Rhomboid Transposition Flap Text: The defect edges were debeveled with a #15 scalpel blade. Given the location of the defect and the proximity to free margins a rhomboid transposition flap was deemed most appropriate. Using a sterile surgical marker, an appropriate rhomboid flap was drawn incorporating the defect. The area thus outlined was incised deep to adipose tissue with a #15 scalpel blade. The skin margins were undermined to an appropriate distance in all directions utilizing iris scissors. Bi-Rhombic Flap Text: The defect edges were debeveled with a #15 scalpel blade. Given the location of the defect and the proximity to free margins a bi-rhombic flap was deemed most appropriate. Using a sterile surgical marker, an appropriate rhombic flap was drawn incorporating the defect. The area thus outlined was incised deep to adipose tissue with a #15 scalpel blade. The skin margins were undermined to an appropriate distance in all directions utilizing iris scissors. Helical Rim Advancement Flap Text: The defect edges were debeveled with a #15 blade scalpel. Given the location of the defect and the proximity to free margins (helical rim) a double helical rim advancement flap was deemed most appropriate. Using a sterile surgical marker, the appropriate advancement flaps were drawn incorporating the defect and placing the expected incisions between the helical rim and antihelix where possible. The area thus outlined was incised through and through with a #15 scalpel blade. With a skin hook and iris scissors, the flaps were gently and sharply undermined and freed up. Bilateral Helical Rim Advancement Flap Text: The defect edges were debeveled with a #15 blade scalpel. Given the location of the defect and the proximity to free margins (helical rim) a bilateral helical rim advancement flap was deemed most appropriate. Using a sterile surgical marker, the appropriate advancement flaps were drawn incorporating the defect and placing the expected incisions between the helical rim and antihelix where possible. The area thus outlined was incised through and through with a #15 scalpel blade. With a skin hook and iris scissors, the flaps were gently and sharply undermined and freed up. Ear Star Wedge Flap Text: The defect edges were debeveled with a #15 blade scalpel. Given the location of the defect and the proximity to free margins (helical rim) an ear star wedge flap was deemed most appropriate. Using a sterile surgical marker, the appropriate flap was drawn incorporating the defect and placing the expected incisions between the helical rim and antihelix where possible. The area thus outlined was incised through and through with a #15 scalpel blade. Banner Transposition Flap Text: The defect edges were debeveled with a #15 scalpel blade. Given the location of the defect and the proximity to free margins a Banner transposition flap was deemed most appropriate. Using a sterile surgical marker, an appropriate flap drawn around the defect. The area thus outlined was incised deep to adipose tissue with a #15 scalpel blade. The skin margins were undermined to an appropriate distance in all directions utilizing iris scissors. Bilobed Flap Text: The defect edges were debeveled with a #15 scalpel blade. Given the location of the defect and the proximity to free margins a bilobe flap was deemed most appropriate. Using a sterile surgical marker, an appropriate bilobe flap drawn around the defect. The area thus outlined was incised deep to adipose tissue with a #15 scalpel blade. The skin margins were undermined to an appropriate distance in all directions utilizing iris scissors. Bilobed Transposition Flap Text: The defect edges were debeveled with a #15 scalpel blade. Given the location of the defect and the proximity to free margins a bilobed transposition flap was deemed most appropriate. Using a sterile surgical marker, an appropriate bilobe flap drawn around the defect. The area thus outlined was incised deep to adipose tissue with a #15 scalpel blade. The skin margins were undermined to an appropriate distance in all directions utilizing iris scissors. Trilobed Flap Text: The defect edges were debeveled with a #15 scalpel blade. Given the location of the defect and the proximity to free margins a trilobed flap was deemed most appropriate. Using a sterile surgical marker, an appropriate trilobed flap drawn around the defect. The area thus outlined was incised deep to adipose tissue with a #15 scalpel blade. The skin margins were undermined to an appropriate distance in all directions utilizing iris scissors. Dorsal Nasal Flap Text: The defect edges were debeveled with a #15 scalpel blade. Given the location of the defect and the proximity to free margins a dorsal nasal flap was deemed most appropriate. Using a sterile surgical marker, an appropriate dorsal nasal flap was drawn around the defect. The area thus outlined was incised deep to adipose tissue with a #15 scalpel blade. The skin margins were undermined to an appropriate distance in all directions utilizing iris scissors. Island Pedicle Flap Text: The defect edges were debeveled with a #15 scalpel blade. Given the location of the defect, shape of the defect and the proximity to free margins an island pedicle advancement flap was deemed most appropriate. Using a sterile surgical marker, an appropriate advancement flap was drawn incorporating the defect, outlining the appropriate donor tissue and placing the expected incisions within the relaxed skin tension lines where possible. The area thus outlined was incised deep to adipose tissue with a #15 scalpel blade. The skin margins were undermined to an appropriate distance in all directions around the primary defect and laterally outward around the island pedicle utilizing iris scissors. There was minimal undermining beneath the pedicle flap. Island Pedicle Flap With Canthal Suspension Text: The defect edges were debeveled with a #15 scalpel blade. Given the location of the defect, shape of the defect and the proximity to free margins an island pedicle advancement flap was deemed most appropriate. Using a sterile surgical marker, an appropriate advancement flap was drawn incorporating the defect, outlining the appropriate donor tissue and placing the expected incisions within the relaxed skin tension lines where possible. The area thus outlined was incised deep to adipose tissue with a #15 scalpel blade. The skin margins were undermined to an appropriate distance in all directions around the primary defect and laterally outward around the island pedicle utilizing iris scissors. There was minimal undermining beneath the pedicle flap. A suspension suture was placed in the canthal tendon to prevent tension and prevent ectropion. Alar Island Pedicle Flap Text: The defect edges were debeveled with a #15 scalpel blade. Given the location of the defect, shape of the defect and the proximity to the alar rim an island pedicle advancement flap was deemed most appropriate. Using a sterile surgical marker, an appropriate advancement flap was drawn incorporating the defect, outlining the appropriate donor tissue and placing the expected incisions within the nasal ala running parallel to the alar rim. The area thus outlined was incised with a #15 scalpel blade. The skin margins were undermined minimally to an appropriate distance in all directions around the primary defect and laterally outward around the island pedicle utilizing iris scissors. There was minimal undermining beneath the pedicle flap. Double Island Pedicle Flap Text: The defect edges were debeveled with a #15 scalpel blade. Given the location of the defect, shape of the defect and the proximity to free margins a double island pedicle advancement flap was deemed most appropriate. Using a sterile surgical marker, an appropriate advancement flap was drawn incorporating the defect, outlining the appropriate donor tissue and placing the expected incisions within the relaxed skin tension lines where possible. The area thus outlined was incised deep to adipose tissue with a #15 scalpel blade. The skin margins were undermined to an appropriate distance in all directions around the primary defect and laterally outward around the island pedicle utilizing iris scissors. There was minimal undermining beneath the pedicle flap. Island Pedicle Flap-Requiring Vessel Identification Text: The defect edges were debeveled with a #15 scalpel blade. Given the location of the defect, shape of the defect and the proximity to free margins an island pedicle advancement flap was deemed most appropriate. Using a sterile surgical marker, an appropriate advancement flap was drawn, based on the axial vessel mentioned above, incorporating the defect, outlining the appropriate donor tissue and placing the expected incisions within the relaxed skin tension lines where possible. The area thus outlined was incised deep to adipose tissue with a #15 scalpel blade. The skin margins were undermined to an appropriate distance in all directions around the primary defect and laterally outward around the island pedicle utilizing iris scissors. There was minimal undermining beneath the pedicle flap. Keystone Flap Text: The defect edges were debeveled with a #15 scalpel blade. Given the location of the defect, shape of the defect a keystone flap was deemed most appropriate. Using a sterile surgical marker, an appropriate keystone flap was drawn incorporating the defect, outlining the appropriate donor tissue and placing the expected incisions within the relaxed skin tension lines where possible. The area thus outlined was incised deep to adipose tissue with a #15 scalpel blade. The skin margins were undermined to an appropriate distance in all directions around the primary defect and laterally outward around the flap utilizing iris scissors. O-T Plasty Text: The defect edges were debeveled with a #15 scalpel blade. Given the location of the defect, shape of the defect and the proximity to free margins an O-T plasty was deemed most appropriate. Using a sterile surgical marker, an appropriate O-T plasty was drawn incorporating the defect and placing the expected incisions within the relaxed skin tension lines where possible. The area thus outlined was incised deep to adipose tissue with a #15 scalpel blade. The skin margins were undermined to an appropriate distance in all directions utilizing iris scissors. O-Z Plasty Text: The defect edges were debeveled with a #15 scalpel blade. Given the location of the defect, shape of the defect and the proximity to free margins an O-Z plasty (double transposition flap) was deemed most appropriate. Using a sterile surgical marker, the appropriate transposition flaps were drawn incorporating the defect and placing the expected incisions within the relaxed skin tension lines where possible. The area thus outlined was incised deep to adipose tissue with a #15 scalpel blade. The skin margins were undermined to an appropriate distance in all directions utilizing iris scissors. Hemostasis was achieved with electrocautery. The flaps were then transposed into place, one clockwise and the other counterclockwise, and anchored with interrupted buried subcutaneous sutures. Double O-Z Plasty Text: The defect edges were debeveled with a #15 scalpel blade. Given the location of the defect, shape of the defect and the proximity to free margins a Double O-Z plasty (double transposition flap) was deemed most appropriate. Using a sterile surgical marker, the appropriate transposition flaps were drawn incorporating the defect and placing the expected incisions within the relaxed skin tension lines where possible. The area thus outlined was incised deep to adipose tissue with a #15 scalpel blade. The skin margins were undermined to an appropriate distance in all directions utilizing iris scissors. Hemostasis was achieved with electrocautery. The flaps were then transposed into place, one clockwise and the other counterclockwise, and anchored with interrupted buried subcutaneous sutures. V-Y Plasty Text: The defect edges were debeveled with a #15 scalpel blade. Given the location of the defect, shape of the defect and the proximity to free margins an V-Y advancement flap was deemed most appropriate. Using a sterile surgical marker, an appropriate advancement flap was drawn incorporating the defect and placing the expected incisions within the relaxed skin tension lines where possible. The area thus outlined was incised deep to adipose tissue with a #15 scalpel blade. The skin margins were undermined to an appropriate distance in all directions utilizing iris scissors. H Plasty Text: Given the location of the defect, shape of the defect and the proximity to free margins a H-plasty was deemed most appropriate for repair. Using a sterile surgical marker, the appropriate advancement arms of the H-plasty were drawn incorporating the defect and placing the expected incisions within the relaxed skin tension lines where possible. The area thus outlined was incised deep to adipose tissue with a #15 scalpel blade. The skin margins were undermined to an appropriate distance in all directions utilizing iris scissors. The opposing advancement arms were then advanced into place in opposite direction and anchored with interrupted buried subcutaneous sutures. W Plasty Text: The lesion was extirpated to the level of the fat with a #15 scalpel blade. Given the location of the defect, shape of the defect and the proximity to free margins a W-plasty was deemed most appropriate for repair. Using a sterile surgical marker, the appropriate transposition arms of the W-plasty were drawn incorporating the defect and placing the expected incisions within the relaxed skin tension lines where possible. The area thus outlined was incised deep to adipose tissue with a #15 scalpel blade. The skin margins were undermined to an appropriate distance in all directions utilizing iris scissors. The opposing transposition arms were then transposed into place in opposite direction and anchored with interrupted buried subcutaneous sutures. Z Plasty Text: The lesion was extirpated to the level of the fat with a #15 scalpel blade. Given the location of the defect, shape of the defect and the proximity to free margins a Z-plasty was deemed most appropriate for repair. Using a sterile surgical marker, the appropriate transposition arms of the Z-plasty were drawn incorporating the defect and placing the expected incisions within the relaxed skin tension lines where possible. The area thus outlined was incised deep to adipose tissue with a #15 scalpel blade. The skin margins were undermined to an appropriate distance in all directions utilizing iris scissors. The opposing transposition arms were then transposed into place in opposite direction and anchored with interrupted buried subcutaneous sutures. Zygomaticofacial Flap Text: Given the location of the defect, shape of the defect and the proximity to free margins a zygomaticofacial flap was deemed most appropriate for repair. Using a sterile surgical marker, the appropriate flap was drawn incorporating the defect and placing the expected incisions within the relaxed skin tension lines where possible. The area thus outlined was incised deep to adipose tissue with a #15 scalpel blade with preservation of a vascular pedicle. The skin margins were undermined to an appropriate distance in all directions utilizing iris scissors. The flap was then placed into the defect and anchored with interrupted buried subcutaneous sutures. Cheek Interpolation Flap Text: A decision was made to reconstruct the defect utilizing an interpolation axial flap and a staged reconstruction. A telfa template was made of the defect. This telfa template was then used to outline the Cheek Interpolation flap. The donor area for the pedicle flap was then injected with anesthesia. The flap was excised through the skin and subcutaneous tissue down to the layer of the underlying musculature. The interpolation flap was carefully excised within this deep plane to maintain its blood supply. The edges of the donor site were undermined. The donor site was closed in a primary fashion. The pedicle was then rotated into position and sutured. Once the tube was sutured into place, adequate blood supply was confirmed with blanching and refill. The pedicle was then wrapped with xeroform gauze and dressed appropriately with a telfa and gauze bandage to ensure continued blood supply and protect the attached pedicle. Cheek-To-Nose Interpolation Flap Text: A decision was made to reconstruct the defect utilizing an interpolation axial flap and a staged reconstruction. A telfa template was made of the defect. This telfa template was then used to outline the Cheek-To-Nose Interpolation flap. The donor area for the pedicle flap was then injected with anesthesia. The flap was excised through the skin and subcutaneous tissue down to the layer of the underlying musculature. The interpolation flap was carefully excised within this deep plane to maintain its blood supply. The edges of the donor site were undermined. The donor site was closed in a primary fashion. The pedicle was then rotated into position and sutured. Once the tube was sutured into place, adequate blood supply was confirmed with blanching and refill. The pedicle was then wrapped with xeroform gauze and dressed appropriately with a telfa and gauze bandage to ensure continued blood supply and protect the attached pedicle. Interpolation Flap Text: A decision was made to reconstruct the defect utilizing an interpolation axial flap and a staged reconstruction. A telfa template was made of the defect. This telfa template was then used to outline the interpolation flap. The donor area for the pedicle flap was then injected with anesthesia. The flap was excised through the skin and subcutaneous tissue down to the layer of the underlying musculature. The interpolation flap was carefully excised within this deep plane to maintain its blood supply. The edges of the donor site were undermined. The donor site was closed in a primary fashion. The pedicle was then rotated into position and sutured. Once the tube was sutured into place, adequate blood supply was confirmed with blanching and refill. The pedicle was then wrapped with xeroform gauze and dressed appropriately with a telfa and gauze bandage to ensure continued blood supply and protect the attached pedicle. Melolabial Interpolation Flap Text: A decision was made to reconstruct the defect utilizing an interpolation axial flap and a staged reconstruction. A telfa template was made of the defect. This telfa template was then used to outline the melolabial interpolation flap. The donor area for the pedicle flap was then injected with anesthesia. The flap was excised through the skin and subcutaneous tissue down to the layer of the underlying musculature. The pedicle flap was carefully excised within this deep plane to maintain its blood supply. The edges of the donor site were undermined. The donor site was closed in a primary fashion. The pedicle was then rotated into position and sutured. Once the tube was sutured into place, adequate blood supply was confirmed with blanching and refill. The pedicle was then wrapped with xeroform gauze and dressed appropriately with a telfa and gauze bandage to ensure continued blood supply and protect the attached pedicle. Mastoid Interpolation Flap Text: A decision was made to reconstruct the defect utilizing an interpolation axial flap and a staged reconstruction. A telfa template was made of the defect. This telfa template was then used to outline the mastoid interpolation flap. The donor area for the pedicle flap was then injected with anesthesia. The flap was excised through the skin and subcutaneous tissue down to the layer of the underlying musculature. The pedicle flap was carefully excised within this deep plane to maintain its blood supply. The edges of the donor site were undermined. The donor site was closed in a primary fashion. The pedicle was then rotated into position and sutured. Once the tube was sutured into place, adequate blood supply was confirmed with blanching and refill. The pedicle was then wrapped with xeroform gauze and dressed appropriately with a telfa and gauze bandage to ensure continued blood supply and protect the attached pedicle. Posterior Auricular Interpolation Flap Text: A decision was made to reconstruct the defect utilizing an interpolation axial flap and a staged reconstruction. A telfa template was made of the defect. This telfa template was then used to outline the posterior auricular interpolation flap. The donor area for the pedicle flap was then injected with anesthesia. The flap was excised through the skin and subcutaneous tissue down to the layer of the underlying musculature. The pedicle flap was carefully excised within this deep plane to maintain its blood supply. The edges of the donor site were undermined. The donor site was closed in a primary fashion. The pedicle was then rotated into position and sutured. Once the tube was sutured into place, adequate blood supply was confirmed with blanching and refill. The pedicle was then wrapped with xeroform gauze and dressed appropriately with a telfa and gauze bandage to ensure continued blood supply and protect the attached pedicle. Paramedian Forehead Flap Text: A decision was made to reconstruct the defect utilizing an interpolation axial flap and a staged reconstruction. A telfa template was made of the defect. This telfa template was then used to outline the paramedian forehead pedicle flap. The donor area for the pedicle flap was then injected with anesthesia. The flap was excised through the skin and subcutaneous tissue down to the layer of the underlying musculature. The pedicle flap was carefully excised within this deep plane to maintain its blood supply. The edges of the donor site were undermined. The donor site was closed in a primary fashion. The pedicle was then rotated into position and sutured. Once the tube was sutured into place, adequate blood supply was confirmed with blanching and refill. The pedicle was then wrapped with xeroform gauze and dressed appropriately with a telfa and gauze bandage to ensure continued blood supply and protect the attached pedicle. Lip Wedge Excision Repair Text: Given the location of the defect and the proximity to free margins a full thickness wedge repair was deemed most appropriate. Using a sterile surgical marker, the appropriate repair was drawn incorporating the defect and placing the expected incisions perpendicular to the vermilion border. The vermilion border was also meticulously outlined to ensure appropriate reapproximation during the repair. The area thus outlined was incised through and through with a #15 scalpel blade. The muscularis and dermis were reaproximated with deep sutures following hemostasis. Care was taken to realign the vermilion border before proceeding with the superficial closure. Once the vermilion was realigned the superfical and mucosal closure was finished. Ftsg Text: The defect edges were debeveled with a #15 scalpel blade. Given the location of the defect, shape of the defect and the proximity to free margins a full thickness skin graft was deemed most appropriate. Using a sterile surgical marker, the primary defect shape was transferred to the donor site. The area thus outlined was incised deep to adipose tissue with a #15 scalpel blade. The harvested graft was then trimmed of adipose tissue until only dermis and epidermis was left. The skin margins of the secondary defect were undermined to an appropriate distance in all directions utilizing iris scissors. The secondary defect was closed with interrupted buried subcutaneous sutures. The skin edges were then re-apposed with running  sutures. The skin graft was then placed in the primary defect and oriented appropriately. Split-Thickness Skin Graft Text: The defect edges were debeveled with a #15 scalpel blade. Given the location of the defect, shape of the defect and the proximity to free margins a split thickness skin graft was deemed most appropriate. Using a sterile surgical marker, the primary defect shape was transferred to the donor site. The split thickness graft was then harvested. The skin graft was then placed in the primary defect and oriented appropriately. Burow's Graft Text: The defect edges were debeveled with a #15 scalpel blade. Given the location of the defect, shape of the defect, the proximity to free margins and the presence of a standing cone deformity a Burow's skin graft was deemed most appropriate. The standing cone was removed and this tissue was then trimmed to the shape of the primary defect. The adipose tissue was also removed until only dermis and epidermis were left. The skin margins of the secondary defect were undermined to an appropriate distance in all directions utilizing iris scissors. The secondary defect was closed with interrupted buried subcutaneous sutures. The skin edges were then re-apposed with running  sutures. The skin graft was then placed in the primary defect and oriented appropriately. Cartilage Graft Text: The defect edges were debeveled with a #15 scalpel blade. Given the location of the defect, shape of the defect, the fact the defect involved a full thickness cartilage defect a cartilage graft was deemed most appropriate. An appropriate donor site was identified, cleansed, and anesthetized. The cartilage graft was then harvested and transferred to the recipient site, oriented appropriately and then sutured into place. The secondary defect was then repaired using a primary closure. Composite Graft Text: The defect edges were debeveled with a #15 scalpel blade. Given the location of the defect, shape of the defect, the proximity to free margins and the fact the defect was full thickness a composite graft was deemed most appropriate. The defect was outline and then transferred to the donor site. A full thickness graft was then excised from the donor site. The graft was then placed in the primary defect, oriented appropriately and then sutured into place. The secondary defect was then repaired using a primary closure. Epidermal Autograft Text: The defect edges were debeveled with a #15 scalpel blade. Given the location of the defect, shape of the defect and the proximity to free margins an epidermal autograft was deemed most appropriate. Using a sterile surgical marker, the primary defect shape was transferred to the donor site. The epidermal graft was then harvested. The skin graft was then placed in the primary defect and oriented appropriately. Dermal Autograft Text: The defect edges were debeveled with a #15 scalpel blade. Given the location of the defect, shape of the defect and the proximity to free margins a dermal autograft was deemed most appropriate. Using a sterile surgical marker, the primary defect shape was transferred to the donor site. The area thus outlined was incised deep to adipose tissue with a #15 scalpel blade. The harvested graft was then trimmed of adipose and epidermal tissue until only dermis was left. The skin graft was then placed in the primary defect and oriented appropriately. Skin Substitute Text: The defect edges were debeveled with a #15 scalpel blade. Given the location of the defect, shape of the defect and the proximity to free margins a skin substitute graft was deemed most appropriate. The graft material was trimmed to fit the size of the defect. The graft was then placed in the primary defect and oriented appropriately. Tissue Cultured Epidermal Autograft Text: The defect edges were debeveled with a #15 scalpel blade. Given the location of the defect, shape of the defect and the proximity to free margins a tissue cultured epidermal autograft was deemed most appropriate. The graft was then trimmed to fit the size of the defect. The graft was then placed in the primary defect and oriented appropriately. Xenograft Text: The defect edges were debeveled with a #15 scalpel blade. Given the location of the defect, shape of the defect and the proximity to free margins a xenograft was deemed most appropriate. The graft was then trimmed to fit the size of the defect. The graft was then placed in the primary defect and oriented appropriately. Purse String (Intermediate) Text: Given the location of the defect and the characteristics of the surrounding skin a purse string intermediate closure was deemed most appropriate. Undermining was performed circumferentially around the surgical defect. A purse string suture was then placed and tightened. Purse String (Simple) Text: Given the location of the defect and the characteristics of the surrounding skin a purse string simple closure was deemed most appropriate. Undermining was performed circumferentially around the surgical defect. A purse string suture was then placed and tightened. Complex Repair And Single Advancement Flap Text: The defect edges were debeveled with a #15 scalpel blade. The primary defect was closed partially with a complex linear closure. Given the location of the remaining defect, shape of the defect and the proximity to free margins a single advancement flap was deemed most appropriate for complete closure of the defect. Using a sterile surgical marker, an appropriate advancement flap was drawn incorporating the defect and placing the expected incisions within the relaxed skin tension lines where possible. The area thus outlined was incised deep to adipose tissue with a #15 scalpel blade. The skin margins were undermined to an appropriate distance in all directions utilizing iris scissors. Complex Repair And Double Advancement Flap Text: The defect edges were debeveled with a #15 scalpel blade. The primary defect was closed partially with a complex linear closure. Given the location of the remaining defect, shape of the defect and the proximity to free margins a double advancement flap was deemed most appropriate for complete closure of the defect. Using a sterile surgical marker, an appropriate advancement flap was drawn incorporating the defect and placing the expected incisions within the relaxed skin tension lines where possible. The area thus outlined was incised deep to adipose tissue with a #15 scalpel blade. The skin margins were undermined to an appropriate distance in all directions utilizing iris scissors. Complex Repair And Modified Advancement Flap Text: The defect edges were debeveled with a #15 scalpel blade. The primary defect was closed partially with a complex linear closure. Given the location of the remaining defect, shape of the defect and the proximity to free margins a modified advancement flap was deemed most appropriate for complete closure of the defect. Using a sterile surgical marker, an appropriate advancement flap was drawn incorporating the defect and placing the expected incisions within the relaxed skin tension lines where possible. The area thus outlined was incised deep to adipose tissue with a #15 scalpel blade. The skin margins were undermined to an appropriate distance in all directions utilizing iris scissors. Complex Repair And A-T Advancement Flap Text: The defect edges were debeveled with a #15 scalpel blade. The primary defect was closed partially with a complex linear closure. Given the location of the remaining defect, shape of the defect and the proximity to free margins an A-T advancement flap was deemed most appropriate for complete closure of the defect. Using a sterile surgical marker, an appropriate advancement flap was drawn incorporating the defect and placing the expected incisions within the relaxed skin tension lines where possible. The area thus outlined was incised deep to adipose tissue with a #15 scalpel blade. The skin margins were undermined to an appropriate distance in all directions utilizing iris scissors. Complex Repair And O-T Advancement Flap Text: The defect edges were debeveled with a #15 scalpel blade. The primary defect was closed partially with a complex linear closure. Given the location of the remaining defect, shape of the defect and the proximity to free margins an O-T advancement flap was deemed most appropriate for complete closure of the defect. Using a sterile surgical marker, an appropriate advancement flap was drawn incorporating the defect and placing the expected incisions within the relaxed skin tension lines where possible. The area thus outlined was incised deep to adipose tissue with a #15 scalpel blade. The skin margins were undermined to an appropriate distance in all directions utilizing iris scissors. Complex Repair And O-L Flap Text: The defect edges were debeveled with a #15 scalpel blade. The primary defect was closed partially with a complex linear closure. Given the location of the remaining defect, shape of the defect and the proximity to free margins an O-L flap was deemed most appropriate for complete closure of the defect. Using a sterile surgical marker, an appropriate flap was drawn incorporating the defect and placing the expected incisions within the relaxed skin tension lines where possible. The area thus outlined was incised deep to adipose tissue with a #15 scalpel blade. The skin margins were undermined to an appropriate distance in all directions utilizing iris scissors. Complex Repair And Bilobe Flap Text: The defect edges were debeveled with a #15 scalpel blade. The primary defect was closed partially with a complex linear closure. Given the location of the remaining defect, shape of the defect and the proximity to free margins a bilobe flap was deemed most appropriate for complete closure of the defect. Using a sterile surgical marker, an appropriate advancement flap was drawn incorporating the defect and placing the expected incisions within the relaxed skin tension lines where possible. The area thus outlined was incised deep to adipose tissue with a #15 scalpel blade. The skin margins were undermined to an appropriate distance in all directions utilizing iris scissors. Complex Repair And Melolabial Flap Text: The defect edges were debeveled with a #15 scalpel blade. The primary defect was closed partially with a complex linear closure. Given the location of the remaining defect, shape of the defect and the proximity to free margins a melolabial flap was deemed most appropriate for complete closure of the defect. Using a sterile surgical marker, an appropriate advancement flap was drawn incorporating the defect and placing the expected incisions within the relaxed skin tension lines where possible. The area thus outlined was incised deep to adipose tissue with a #15 scalpel blade. The skin margins were undermined to an appropriate distance in all directions utilizing iris scissors. Complex Repair And Rotation Flap Text: The defect edges were debeveled with a #15 scalpel blade. The primary defect was closed partially with a complex linear closure. Given the location of the remaining defect, shape of the defect and the proximity to free margins a rotation flap was deemed most appropriate for complete closure of the defect. Using a sterile surgical marker, an appropriate advancement flap was drawn incorporating the defect and placing the expected incisions within the relaxed skin tension lines where possible. The area thus outlined was incised deep to adipose tissue with a #15 scalpel blade. The skin margins were undermined to an appropriate distance in all directions utilizing iris scissors. Complex Repair And Rhombic Flap Text: The defect edges were debeveled with a #15 scalpel blade. The primary defect was closed partially with a complex linear closure. Given the location of the remaining defect, shape of the defect and the proximity to free margins a rhombic flap was deemed most appropriate for complete closure of the defect. Using a sterile surgical marker, an appropriate advancement flap was drawn incorporating the defect and placing the expected incisions within the relaxed skin tension lines where possible. The area thus outlined was incised deep to adipose tissue with a #15 scalpel blade. The skin margins were undermined to an appropriate distance in all directions utilizing iris scissors. Complex Repair And Transposition Flap Text: The defect edges were debeveled with a #15 scalpel blade. The primary defect was closed partially with a complex linear closure. Given the location of the remaining defect, shape of the defect and the proximity to free margins a transposition flap was deemed most appropriate for complete closure of the defect. Using a sterile surgical marker, an appropriate advancement flap was drawn incorporating the defect and placing the expected incisions within the relaxed skin tension lines where possible. The area thus outlined was incised deep to adipose tissue with a #15 scalpel blade. The skin margins were undermined to an appropriate distance in all directions utilizing iris scissors. Complex Repair And V-Y Plasty Text: The defect edges were debeveled with a #15 scalpel blade. The primary defect was closed partially with a complex linear closure. Given the location of the remaining defect, shape of the defect and the proximity to free margins a V-Y plasty was deemed most appropriate for complete closure of the defect. Using a sterile surgical marker, an appropriate advancement flap was drawn incorporating the defect and placing the expected incisions within the relaxed skin tension lines where possible. The area thus outlined was incised deep to adipose tissue with a #15 scalpel blade. The skin margins were undermined to an appropriate distance in all directions utilizing iris scissors. Complex Repair And M Plasty Text: The defect edges were debeveled with a #15 scalpel blade. The primary defect was closed partially with a complex linear closure. Given the location of the remaining defect, shape of the defect and the proximity to free margins an M plasty was deemed most appropriate for complete closure of the defect. Using a sterile surgical marker, an appropriate advancement flap was drawn incorporating the defect and placing the expected incisions within the relaxed skin tension lines where possible. The area thus outlined was incised deep to adipose tissue with a #15 scalpel blade. The skin margins were undermined to an appropriate distance in all directions utilizing iris scissors. Complex Repair And Double M Plasty Text: The defect edges were debeveled with a #15 scalpel blade. The primary defect was closed partially with a complex linear closure. Given the location of the remaining defect, shape of the defect and the proximity to free margins a double M plasty was deemed most appropriate for complete closure of the defect. Using a sterile surgical marker, an appropriate advancement flap was drawn incorporating the defect and placing the expected incisions within the relaxed skin tension lines where possible. The area thus outlined was incised deep to adipose tissue with a #15 scalpel blade. The skin margins were undermined to an appropriate distance in all directions utilizing iris scissors. Complex Repair And W Plasty Text: The defect edges were debeveled with a #15 scalpel blade. The primary defect was closed partially with a complex linear closure. Given the location of the remaining defect, shape of the defect and the proximity to free margins a W plasty was deemed most appropriate for complete closure of the defect. Using a sterile surgical marker, an appropriate advancement flap was drawn incorporating the defect and placing the expected incisions within the relaxed skin tension lines where possible. The area thus outlined was incised deep to adipose tissue with a #15 scalpel blade. The skin margins were undermined to an appropriate distance in all directions utilizing iris scissors. Complex Repair And Z Plasty Text: The defect edges were debeveled with a #15 scalpel blade. The primary defect was closed partially with a complex linear closure. Given the location of the remaining defect, shape of the defect and the proximity to free margins a Z plasty was deemed most appropriate for complete closure of the defect. Using a sterile surgical marker, an appropriate advancement flap was drawn incorporating the defect and placing the expected incisions within the relaxed skin tension lines where possible. The area thus outlined was incised deep to adipose tissue with a #15 scalpel blade. The skin margins were undermined to an appropriate distance in all directions utilizing iris scissors. Complex Repair And Dorsal Nasal Flap Text: The defect edges were debeveled with a #15 scalpel blade. The primary defect was closed partially with a complex linear closure. Given the location of the remaining defect, shape of the defect and the proximity to free margins a dorsal nasal flap was deemed most appropriate for complete closure of the defect. Using a sterile surgical marker, an appropriate flap was drawn incorporating the defect and placing the expected incisions within the relaxed skin tension lines where possible. The area thus outlined was incised deep to adipose tissue with a #15 scalpel blade. The skin margins were undermined to an appropriate distance in all directions utilizing iris scissors. Complex Repair And Ftsg Text: The defect edges were debeveled with a #15 scalpel blade. The primary defect was closed partially with a complex linear closure. Given the location of the defect, shape of the defect and the proximity to free margins a full thickness skin graft was deemed most appropriate to repair the remaining defect. The graft was trimmed to fit the size of the remaining defect. The graft was then placed in the primary defect, oriented appropriately, and sutured into place. Complex Repair And Burow's Graft Text: The defect edges were debeveled with a #15 scalpel blade. The primary defect was closed partially with a complex linear closure. Given the location of the defect, shape of the defect, the proximity to free margins and the presence of a standing cone deformity a Burow's graft was deemed most appropriate to repair the remaining defect. The graft was trimmed to fit the size of the remaining defect. The graft was then placed in the primary defect, oriented appropriately, and sutured into place. Complex Repair And Split-Thickness Skin Graft Text: The defect edges were debeveled with a #15 scalpel blade. The primary defect was closed partially with a complex linear closure. Given the location of the defect, shape of the defect and the proximity to free margins a split thickness skin graft was deemed most appropriate to repair the remaining defect. The graft was trimmed to fit the size of the remaining defect. The graft was then placed in the primary defect, oriented appropriately, and sutured into place. Complex Repair And Epidermal Autograft Text: The defect edges were debeveled with a #15 scalpel blade. The primary defect was closed partially with a complex linear closure. Given the location of the defect, shape of the defect and the proximity to free margins an epidermal autograft was deemed most appropriate to repair the remaining defect. The graft was trimmed to fit the size of the remaining defect. The graft was then placed in the primary defect, oriented appropriately, and sutured into place. Complex Repair And Dermal Autograft Text: The defect edges were debeveled with a #15 scalpel blade. The primary defect was closed partially with a complex linear closure. Given the location of the defect, shape of the defect and the proximity to free margins an dermal autograft was deemed most appropriate to repair the remaining defect. The graft was trimmed to fit the size of the remaining defect. The graft was then placed in the primary defect, oriented appropriately, and sutured into place. Complex Repair And Tissue Cultured Epidermal Autograft Text: The defect edges were debeveled with a #15 scalpel blade. The primary defect was closed partially with a complex linear closure. Given the location of the defect, shape of the defect and the proximity to free margins an tissue cultured epidermal autograft was deemed most appropriate to repair the remaining defect. The graft was trimmed to fit the size of the remaining defect. The graft was then placed in the primary defect, oriented appropriately, and sutured into place. Complex Repair And Xenograft Text: The defect edges were debeveled with a #15 scalpel blade. The primary defect was closed partially with a complex linear closure. Given the location of the defect, shape of the defect and the proximity to free margins a xenograft was deemed most appropriate to repair the remaining defect. The graft was trimmed to fit the size of the remaining defect. The graft was then placed in the primary defect, oriented appropriately, and sutured into place. Complex Repair And Skin Substitute Graft Text: The defect edges were debeveled with a #15 scalpel blade. The primary defect was closed partially with a complex linear closure. Given the location of the remaining defect, shape of the defect and the proximity to free margins a skin substitute graft was deemed most appropriate to repair the remaining defect. The graft was trimmed to fit the size of the remaining defect. The graft was then placed in the primary defect, oriented appropriately, and sutured into place. Path Notes (To The Dermatopathologist): Please check margins. Consent was obtained from the patient. The risks and benefits to therapy were discussed in detail. Specifically, the risks of infection, scarring, bleeding, prolonged wound healing, incomplete removal, allergy to anesthesia, nerve injury and recurrence were addressed. Prior to the procedure, the treatment site was clearly identified and confirmed by the patient. All components of Universal Protocol/PAUSE Rule completed. Render Post-Care Instructions In Note?: yes Post-Care Instructions: I reviewed with the patient in detail post-care instructions. Patient is not to engage in any heavy lifting, exercise, or swimming for the next 14 days. Should the patient develop any fevers, chills, bleeding, severe pain patient will contact the office immediately. Home Suture Removal Text: Patient was provided a home suture removal kit and will remove their sutures at home. If they have any questions or difficulties they will call the office. Where Do You Want The Question To Include Opioid Counseling Located?: Case Summary Tab Billing Type: [employer] Information: Selecting Yes will display possible errors in your note based on the variables you have selected. This validation is only offered as a suggestion for you. PLEASE NOTE THAT THE VALIDATION TEXT WILL BE REMOVED WHEN YOU FINALIZE YOUR NOTE. IF YOU WANT TO FAX A PRELIMINARY NOTE YOU WILL NEED TO TOGGLE THIS TO 'NO' IF YOU DO NOT WANT IT IN YOUR FAXED NOTE.

## 2023-12-22 DIAGNOSIS — I48.19 OTHER PERSISTENT ATRIAL FIBRILLATION: ICD-10-CM

## 2023-12-22 DIAGNOSIS — Z01.818 ENCOUNTER FOR OTHER PREPROCEDURAL EXAMINATION: ICD-10-CM

## 2023-12-22 LAB
BLD GP AB SCN SERPL QL: SIGNIFICANT CHANGE UP
BLD GP AB SCN SERPL QL: SIGNIFICANT CHANGE UP

## 2024-01-11 ENCOUNTER — INPATIENT (INPATIENT)
Facility: HOSPITAL | Age: 59
LOS: 0 days | Discharge: HOME | DRG: 274 | End: 2024-01-12
Attending: STUDENT IN AN ORGANIZED HEALTH CARE EDUCATION/TRAINING PROGRAM | Admitting: STUDENT IN AN ORGANIZED HEALTH CARE EDUCATION/TRAINING PROGRAM
Payer: COMMERCIAL

## 2024-01-11 ENCOUNTER — APPOINTMENT (OUTPATIENT)
Dept: ELECTROPHYSIOLOGY | Facility: HOSPITAL | Age: 59
End: 2024-01-11

## 2024-01-11 ENCOUNTER — TRANSCRIPTION ENCOUNTER (OUTPATIENT)
Age: 59
End: 2024-01-11

## 2024-01-11 VITALS
OXYGEN SATURATION: 98 % | RESPIRATION RATE: 15 BRPM | HEIGHT: 68 IN | WEIGHT: 250 LBS | SYSTOLIC BLOOD PRESSURE: 171 MMHG | TEMPERATURE: 98 F | DIASTOLIC BLOOD PRESSURE: 71 MMHG | HEART RATE: 74 BPM

## 2024-01-11 DIAGNOSIS — I48.19 OTHER PERSISTENT ATRIAL FIBRILLATION: ICD-10-CM

## 2024-01-11 DIAGNOSIS — I48.91 UNSPECIFIED ATRIAL FIBRILLATION: ICD-10-CM

## 2024-01-11 DIAGNOSIS — Z98.890 OTHER SPECIFIED POSTPROCEDURAL STATES: Chronic | ICD-10-CM

## 2024-01-11 DIAGNOSIS — Z98.891 HISTORY OF UTERINE SCAR FROM PREVIOUS SURGERY: Chronic | ICD-10-CM

## 2024-01-11 LAB
BLD GP AB SCN SERPL QL: SIGNIFICANT CHANGE UP
BLD GP AB SCN SERPL QL: SIGNIFICANT CHANGE UP

## 2024-01-11 PROCEDURE — C9399: CPT

## 2024-01-11 PROCEDURE — 93657 TX L/R ATRIAL FIB ADDL: CPT

## 2024-01-11 PROCEDURE — 93656 COMPRE EP EVAL ABLTJ ATR FIB: CPT

## 2024-01-11 PROCEDURE — 93325 DOPPLER ECHO COLOR FLOW MAPG: CPT | Mod: 26

## 2024-01-11 PROCEDURE — 93320 DOPPLER ECHO COMPLETE: CPT | Mod: 26

## 2024-01-11 PROCEDURE — 93312 ECHO TRANSESOPHAGEAL: CPT | Mod: 26

## 2024-01-11 PROCEDURE — 93623 PRGRMD STIMJ&PACG IV RX NFS: CPT | Mod: 26

## 2024-01-11 PROCEDURE — 93655 ICAR CATH ABLTJ DSCRT ARRHYT: CPT

## 2024-01-11 PROCEDURE — 93623 PRGRMD STIMJ&PACG IV RX NFS: CPT

## 2024-01-11 PROCEDURE — 93005 ELECTROCARDIOGRAM TRACING: CPT

## 2024-01-11 RX ORDER — FAMOTIDINE 10 MG/ML
20 INJECTION INTRAVENOUS ONCE
Refills: 0 | Status: COMPLETED | OUTPATIENT
Start: 2024-01-11 | End: 2024-01-11

## 2024-01-11 RX ORDER — TRAMADOL HYDROCHLORIDE 50 MG/1
50 TABLET ORAL ONCE
Refills: 0 | Status: DISCONTINUED | OUTPATIENT
Start: 2024-01-11 | End: 2024-01-11

## 2024-01-11 RX ORDER — ACETAMINOPHEN 500 MG
1000 TABLET ORAL ONCE
Refills: 0 | Status: COMPLETED | OUTPATIENT
Start: 2024-01-11 | End: 2024-01-11

## 2024-01-11 RX ORDER — RIVAROXABAN 15 MG-20MG
20 KIT ORAL DAILY
Refills: 0 | Status: DISCONTINUED | OUTPATIENT
Start: 2024-01-11 | End: 2024-01-12

## 2024-01-11 RX ORDER — AMIODARONE HYDROCHLORIDE 400 MG/1
200 TABLET ORAL DAILY
Refills: 0 | Status: DISCONTINUED | OUTPATIENT
Start: 2024-01-11 | End: 2024-01-12

## 2024-01-11 RX ORDER — METOPROLOL TARTRATE 50 MG
1 TABLET ORAL
Refills: 0 | DISCHARGE

## 2024-01-11 RX ORDER — METOPROLOL TARTRATE 50 MG
50 TABLET ORAL
Refills: 0 | Status: DISCONTINUED | OUTPATIENT
Start: 2024-01-11 | End: 2024-01-12

## 2024-01-11 RX ADMIN — TRAMADOL HYDROCHLORIDE 50 MILLIGRAM(S): 50 TABLET ORAL at 23:07

## 2024-01-11 RX ADMIN — Medication 1000 MILLIGRAM(S): at 19:44

## 2024-01-11 RX ADMIN — Medication 400 MILLIGRAM(S): at 19:28

## 2024-01-11 RX ADMIN — FAMOTIDINE 20 MILLIGRAM(S): 10 INJECTION INTRAVENOUS at 19:08

## 2024-01-11 RX ADMIN — RIVAROXABAN 20 MILLIGRAM(S): KIT at 19:09

## 2024-01-11 NOTE — DISCHARGE NOTE PROVIDER - NSDCCPTREATMENT_GEN_ALL_CORE_FT
PRINCIPAL PROCEDURE  Procedure: Atrial ablation  Findings and Treatment: - Please start taking pantoprazole 40 mg daily (famotidine 20 mg daily) for 30 days.   - Please start taking aspirin 81 mg daily.   - You should restart your Xarelto ****  - You may shower today.  - Do not drive or operate heavy machinery for 3 days.  - Do not submerge in water (example: baths, swimming) for 1 week.  - No squatting, exertional activities, or lifting anything > 10 lbs for 1 week.  - Any sudden swelling, redness, fever, discharge, or severe pain, call the Electrophysiology Office at [phone].  - Fu with Dr. Burleson in 1 month.     PRINCIPAL PROCEDURE  Procedure: Atrial ablation  Findings and Treatment: - Please start taking pantoprazole 40 mg daily (famotidine 20 mg daily) for 30 days.   - Please start taking aspirin 81 mg daily.   - You should restart your Xarelto ****  - You may shower today.  - Do not drive or operate heavy machinery for 3 days.  - Do not submerge in water (example: baths, swimming) for 1 week.  - No squatting, exertional activities, or lifting anything > 10 lbs for 1 week.  - Any sudden swelling, redness, fever, discharge, or severe pain, call the Electrophysiology Office at [phone].  - Fu with Dr. Burleson in 1 month.     PRINCIPAL PROCEDURE  Procedure: Atrial ablation  Findings and Treatment: - Please start taking pepcid 20mg daily x 1 month  - You should restart your Xarelto 20mg nightly.  - You may shower today.  - Do not drive or operate heavy machinery for 3 days.  - Do not submerge in water (example: baths, swimming) for 1 week.  - No squatting, exertional activities, or lifting anything > 10 lbs for 1 week.  - Any sudden swelling, redness, fever, discharge, or severe pain, call the Electrophysiology Office at [phone].  - Fu with Dr. Burleson on 02/26/24 4pm @ 1110 S e Office     PRINCIPAL PROCEDURE  Procedure: Atrial ablation  Findings and Treatment: - Please start taking pepcid 20mg daily x 1 month  - You should restart your Xarelto 20mg nightly.  - You may shower today.  - Do not drive or operate heavy machinery for 3 days.  - Do not submerge in water (example: baths, swimming) for 1 week.  - No squatting, exertional activities, or lifting anything > 10 lbs for 1 week.  - Any sudden swelling, redness, fever, discharge, or severe pain, call the Electrophysiology Office at [phone].  - Fu with Dr. Burleson on 02/26/24 4pm @ 1110 S e Office

## 2024-01-11 NOTE — DISCHARGE NOTE PROVIDER - NSDCFUSCHEDAPPT_GEN_ALL_CORE_FT
[facility]  [facility] [address]  Scheduled Appointment: 02/26/2024     [facility]  [facility] [address]  Scheduled Appointment: 02/26/2024

## 2024-01-11 NOTE — DISCHARGE NOTE PROVIDER - PROVIDER TOKENS
PROVIDER:[TOKEN:[[id number]],FOLLOWUP:[1 month]] PROVIDER:[TOKEN:[[id number]],FOLLOWUP:[1 month]] PROVIDER:[TOKEN:[[id number]],SCHEDULEDAPPT:[02/26/2024],SCHEDULEDAPPTTIME:[04:00 PM]] PROVIDER:[TOKEN:[[id number]],SCHEDULEDAPPT:[02/26/2024],SCHEDULEDAPPTTIME:[04:00 PM]]

## 2024-01-11 NOTE — DISCHARGE NOTE PROVIDER - CARE PROVIDERS DIRECT ADDRESSES
,[email or web address]

## 2024-01-11 NOTE — DISCHARGE NOTE PROVIDER - NSDCMRMEDTOKEN_GEN_ALL_CORE_FT
amiodarone 200 mg oral tablet: 1 tab(s) orally once a day MDD:200mg  metoprolol tartrate 50 mg oral tablet: 1 tab(s) orally 2 times a day  rivaroxaban 20 mg oral tablet: 1 tab(s) orally once a day (before a meal) MDD:20mg  torsemide 20 mg oral tablet: 1 tab(s) orally once a day MDD:20mg   amiodarone 200 mg oral tablet: 1 tab(s) orally once a day MDD:200mg  metoprolol tartrate 50 mg oral tablet: 1 tab(s) orally 2 times a day  Pepcid 20 mg oral tablet: 1 tab(s) orally once a day  rivaroxaban 20 mg oral tablet: 1 tab(s) orally once a day (before a meal) MDD:20mg  torsemide 20 mg oral tablet: 1 tab(s) orally once a day MDD:20mg

## 2024-01-11 NOTE — ASU PATIENT PROFILE, ADULT - INTERNATIONAL TRAVEL
94 Ventricular Rate BPM  94 Atrial Rate BPM  158 P-R Interval ms  76 QRS Duration ms  362 Q-T Interval ms  452 QTC Calculation(Bezet) ms  74 P Axis degrees  75 R Axis degrees  38 T Axis degrees  Normal sinus rhythm  Possible Left atrial enlargement  Abnormal ECG  Confirmed by Jacinto CHAKRABORTY, Pending sale to [facility] ([id number]) on 7/15/2019 9:41:54 PM  
No

## 2024-01-11 NOTE — CHART NOTE - NSCHARTNOTEFT_GEN_A_CORE
I saw and examined patient and I reviewed his chart and blood work. I attest that there has been no clinical change in patient's condition since last assessment documented in H&P, consult, or last office visit.
Electrophysiology Brief Post-Op Note      I have personally seen and examined the patient.  I agree with the history and physical which I have reviewed and noted any changes below.     PRE-OP DIAGNOSIS:  -Persistent Atrial Fibrillation    POST-OP DIAGNOSIS:  -Persistent Atrial Fibrillation, Atrial Flutter (CTI) isthmus dependent      PROCEDURE:  -Radiofrequency (RF) ablation of Atrial Fibrillation   -3D mapping   -Infusion of Medicine  -Transeptal puncture  -Use of intracardiac Echo  -DIANNE      Vascular Access used (using Ultrasound Guidance and micropuncture needle)  -Right Femoral Vein: 8F    -Left Femoral Vein: 11F 8F  -Right Femoral Artery: none    Physician: Benjy  Assistant: None    ANESTHESIA TYPE:  [X]General Anesthesia  [  ] Sedation  [X] Local/Regional      CONDITION  [  ] Critical  [  ] Serious  [  ]Fair  [X]Good      SPECIMENS REMOVED (IF APPLICABLE): NONE      IMPLANTS (IF APPLICABLE): NONE      COMPLICATIONS:  -No immediate complications      FINDINGS (see below)    -Successful radiofrequency (RF) ablation of Atrial Fibrillation in the form of:    1-Pulmonary Veins Isolation     2-Successful Posterior Wall Isolation    3-Successful CTI isthmus ablation    -No pericardial effusion on ICE  -All sheaths were removed and manual pressure applied    -ESTIMATED BLOOD LOSS:  15 mL  -Contrast Used: none  -Protamine: 60 mg   -Lasix 20 mg IVP  -Fluid balance +1445 cc    PLAN OF CARE  -	Start Eliquis 5 mg q12h continuous schedule  -	Start Xarelto 20 mg qd continuous schedule  -	Start pradaxa 150 mg continuous schedule  -	Continue warfarin  -	Start pepcid 20 mg daily - first dose today  -	Bed rest for 4 hours  -	Admit to telemetry

## 2024-01-11 NOTE — PATIENT PROFILE ADULT - FLU SEASON?
[facility] Emergency Department  [address] [place] [address]  Phone:  [phone]  Fax:  [phone]                                    Rosemarie Wade   MRN: [id number]    Department:  [facility] Emergency Department   Date of Visit:  3/24/2020           After Visit Summary Signature Page    I have received my discharge instructions, and my questions have been answered. I have discussed any challenges I see with this plan with the nurse or doctor.    ..........................................................................................................................................  Patient/Patient Representative Signature      ..........................................................................................................................................  Patient Representative Print Name and Relationship to Patient    ..................................................               ................................................  Date                                   Time    ..........................................................................................................................................  Reviewed by Signature/Title    ...................................................              ..............................................  Date                                               Time          22EPIC Rev 08/18       
Yes...

## 2024-01-11 NOTE — ASU PATIENT PROFILE, ADULT - FALL HARM RISK - FALLEN IN PAST
Message   Recorded as Task   Date: 02/06/2017 08:51 AM, Created By: Alicia De   Task Name: Medical Complaint Callback   Assigned To: [facility] triage,Team   Regarding Patient: Alana Vora, Status: In Progress   Comment:    Shoneberger,Courtney - 06 Feb 2017 8:51 AM     TASK CREATED  Caller: Self; Medical Complaint; [phone] (Home)  OSLO pt  throat pain, nose bleeding, coughing, headaches   Dai Ruano - 06 Feb 2017 9:27 AM     TASK IN PROGRESS   Dai Ruano - 06 Feb 2017 9:31 AM     TASK EDITED  tHROAT PAIN 2 DAYS AGO  cOUGH CAME AFTER THROAT PAIN  No fever  Drinking  No difficulty breathing  Hx strept  apt 1120a given        Active Problems   1  Acne (706 1) (L70 9)  2  Allergic rhinitis due to pollen (477 0) (J30 1)  3  Chronic pain of left knee (296 99,663 48) (M25 562,G89 29)  4  Dizziness (780 4) (R42)  5  Flu-like symptoms (780 99) (R68 89)  6  Liver enzyme elevation (790 5) (R74 8)  7  Musculoskeletal pain (729 1) (M79 1)  8  Sore throat (462) (J02 9)  9  Viral infection (079 99) (B34 9)    Current Meds  1  Acetaminophen 325 MG Oral Tablet; take 2 tabs PO q 4-6 hours as needed for   headache; Therapy: 66YLQ1693 to (Last Rx:26Mar2015)  Requested for: 26Mar2015 Ordered  2  Ayr 0 65 % Nasal Solution; 2 sprays in each nostril 3 times a day; Therapy: 86RXE9591 to (Last Rx:06Apr2016)  Requested for: 06Apr2016 Ordered  3  Benzoyl Peroxide-Erythromycin 5-3 % External Gel; apply in thin coat to affected areas   daily at bedtime; Therapy: 53LNN7304 to (Last Rx:46Bmp3972)  Requested for: 36Jtj4795 Ordered    Allergies   1   No Known Drug Allergies    Signatures   Electronically signed by : Pb Mckinley, ; Feb 6 2017  9:31AM EST                       (Author)    Electronically signed by : Lalit Douglas, [facility]; Feb 6 2017  9:48AM EST                       (Acknowledgement) No

## 2024-01-11 NOTE — DISCHARGE NOTE PROVIDER - HOSPITAL COURSE
Patient is a 58y Female  with PMHx of Htn, hld, obesity, CM with EF 40-45%, longstanding paroxysmal atrial fibrillation, converted to persistent atrial fibrillation in 2022 who presented to [facility] for elective AF Ablation. On 1/11/2024 patient underwent successful RFA for Atrial fibrillation/ Aflutter Patient was monitored overnight. On POD 1 patient remains HD stable with no complaints. Patient remains in SR with no arrhythmias noted on tele. Examination of B/L common femoral venous access sites reveal a Clear and dry area with no hematoma or erythema.(For PVI- Patient should continue Xarelto/Eliquis for thromboembolic prophylaxis). Patient is being DC home in stable condition. Patient is a 58y Female  with PMHx of Htn, hld, obesity, CM with EF 40-45%, longstanding paroxysmal atrial fibrillation, converted to persistent atrial fibrillation in 2022 who presented to [facility] for elective AF Ablation. On 1/11/2024 patient underwent successful RFA for Atrial fibrillation/ Aflutter Patient was monitored overnight. On POD 1 patient remains HD stable with no complaints. Patient remains in SR with no arrhythmias noted on tele. Examination of B/L common femoral venous access sites reveal a Clear and dry area with no hematoma or erythema.(For PVI- Patient should continue Xarelto/Eliquis for thromboembolic prophylaxis). Patient is being DC home in stable condition. Patient is a 58y Female  with PMHx of Htn, hld, obesity, CM with EF 40-45%, longstanding paroxysmal atrial fibrillation, converted to persistent atrial fibrillation in 2022 who presented to [facility] for elective AF Ablation. On 1/11/2024 patient underwent successful RFA for Atrial fibrillation/ Aflutter Patient was monitored overnight. On POD 1 patient remains HD stable with no complaints. Patient remains in SR with no arrhythmias noted on tele. Examination of B/L common femoral venous access sites reveal a Clear and dry area with no hematoma or erythema. Patient is being DC home in stable condition. Patient is a 58y Female  with PMHx of Htn, hld, obesity, CM with EF 40-45%, longstanding paroxysmal atrial fibrillation, converted to persistent atrial fibrillation in 2022 who presented to [facility] for elective AF Ablation. On 1/11/2024 patient underwent successful RFA for Atrial fibrillation/ Aflutter Patient was monitored overnight. On POD 1 patient remains HD stable with no complaints. Patient remains in SR with no arrhythmias noted on tele. Examination of B/L common femoral venous access sites reveal a Clear and dry area with no hematoma or erythema. Patient is being DC home in stable condition.

## 2024-01-11 NOTE — PATIENT PROFILE ADULT - FALL HARM RISK - HARM RISK INTERVENTIONS
Assistance with ambulation/Assistance OOB with selected safe patient handling equipment/Communicate Risk of Fall with Harm to all staff/Discuss with provider need for PT consult/Monitor gait and stability/Provide patient with walking aids - walker, cane, crutches/Reinforce activity limits and safety measures with patient and family/Sit up slowly, dangle for a short time, stand at bedside before walking/Tailored Fall Risk Interventions/Use of alarms - bed, chair and/or voice tab/Visual Cue: Yellow wristband and red socks/Bed in lowest position, wheels locked, appropriate side rails in place/Call bell, personal items and telephone in reach/Instruct patient to call for assistance before getting out of bed or chair/Non-slip footwear when patient is out of bed/[place] to call system/Physically safe environment - no spills, clutter or unnecessary equipment/Purposeful Proactive Rounding/Room/bathroom lighting operational, light cord in reach Assistance with ambulation/Assistance OOB with selected safe patient handling equipment/Communicate Risk of Fall with Harm to all staff/Discuss with provider need for PT consult/Monitor gait and stability/Provide patient with walking aids - walker, cane, crutches/Reinforce activity limits and safety measures with patient and family/Sit up slowly, dangle for a short time, stand at bedside before walking/Tailored Fall Risk Interventions/Use of alarms - bed, chair and/or voice tab/Visual Cue: Yellow wristband and red socks/Bed in lowest position, wheels locked, appropriate side rails in place/Call bell, personal items and telephone in reach/Instruct patient to call for assistance before getting out of bed or chair/Non-slip footwear when patient is out of bed/[place] to call system/Physically safe environment - no spills, clutter or unnecessary equipment/Purposeful Proactive Rounding/Room/bathroom lighting operational, light cord in reach

## 2024-01-11 NOTE — ASU PATIENT PROFILE, ADULT - FALL HARM RISK - UNIVERSAL INTERVENTIONS
Bed in lowest position, wheels locked, appropriate side rails in place/Call bell, personal items and telephone in reach/Instruct patient to call for assistance before getting out of bed or chair/Non-slip footwear when patient is out of bed/[place] to call system/Physically safe environment - no spills, clutter or unnecessary equipment/Purposeful Proactive Rounding/Room/bathroom lighting operational, light cord in reach Bed in lowest position, wheels locked, appropriate side rails in place/Call bell, personal items and telephone in reach/Instruct patient to call for assistance before getting out of bed or chair/Non-slip footwear when patient is out of bed/[place] to call system/Physically safe environment - no spills, clutter or unnecessary equipment/Purposeful Proactive Rounding/Room/bathroom lighting operational, light cord in reach

## 2024-01-11 NOTE — DISCHARGE NOTE PROVIDER - CARE PROVIDER_API CALL
Lyndon Burleson  Cardiac Electrophysiology  [address] [place] [address], [address]  [place] [address]  Phone: [phone]  Fax: [phone]  Follow Up Time: 1 month   Lyndon Burleson  Cardiac Electrophysiology  [address] [place] [address], [address]  [place] [address]  Phone: [phone]  Fax: [phone]  Follow Up Time: 1 month   Lyndon Burleson  Cardiac Electrophysiology  [address] [place] [address], [address]  [place] [address]  Phone: [phone]  Fax: [phone]  Scheduled Appointment: 02/26/2024 04:00 PM   Lyndon Burleson  Cardiac Electrophysiology  [address] [place] [address], [address]  [place] [address]  Phone: [phone]  Fax: [phone]  Scheduled Appointment: 02/26/2024 04:00 PM

## 2024-01-11 NOTE — PATIENT PROFILE ADULT - LAST TOBACCO USE (DD-MM-YY)
"Spoke to patient and advised she can schedule a Virtual video visit with a Derm Provider.     \"it is on my leg and probably in my groin now, I have dealt with it before and it always goes away with Hydrocortisone cream, but it is not going away. It is in the same area as before..\"     Denies blisters and states: \"It's hard for me to see it and I don't have any pain like last time..\"    Since patient reports rash is in groin, advised an in person appt may be more appropriate. Scheduled for follow up. Gudelia Huizar RN    "
Reason for Call:  Other     Detailed comments: Pt thinks she may have shingles again (rash that is unbelievably itchy located on her lt leg - same area she previously had shingles in 10/2018) - Please contact pt     Phone Number Patient can be reached at: Home number on file [phone] (home)    Best Time: Any    Can we leave a detailed message on this number? NO    Call taken on 8/18/2020 at 11:50 AM by Denise Behrendt      
11-Jan-2001

## 2024-01-12 ENCOUNTER — TRANSCRIPTION ENCOUNTER (OUTPATIENT)
Age: 59
End: 2024-01-12

## 2024-01-12 VITALS
TEMPERATURE: 98 F | DIASTOLIC BLOOD PRESSURE: 71 MMHG | RESPIRATION RATE: 18 BRPM | HEART RATE: 57 BPM | SYSTOLIC BLOOD PRESSURE: 126 MMHG

## 2024-01-12 PROCEDURE — 99239 HOSP IP/OBS DSCHRG MGMT >30: CPT

## 2024-01-12 PROCEDURE — 99232 SBSQ HOSP IP/OBS MODERATE 35: CPT

## 2024-01-12 PROCEDURE — 93010 ELECTROCARDIOGRAM REPORT: CPT

## 2024-01-12 RX ORDER — FAMOTIDINE 10 MG/ML
1 INJECTION INTRAVENOUS
Qty: 30 | Refills: 0
Start: 2024-01-12 | End: 2024-02-10

## 2024-01-12 RX ADMIN — AMIODARONE HYDROCHLORIDE 200 MILLIGRAM(S): 400 TABLET ORAL at 05:42

## 2024-01-12 RX ADMIN — TRAMADOL HYDROCHLORIDE 50 MILLIGRAM(S): 50 TABLET ORAL at 00:07

## 2024-01-12 RX ADMIN — Medication 50 MILLIGRAM(S): at 05:42

## 2024-01-12 RX ADMIN — Medication 20 MILLIGRAM(S): at 05:42

## 2024-01-12 NOTE — DISCHARGE NOTE NURSING/CASE MANAGEMENT/SOCIAL WORK - NSDCPEFALRISK_GEN_ALL_CORE
For information on Fall & Injury Prevention, visit: [email or web address] OR  [email or web address] OR  https://www.cdc.gov/steadi/patient.html For information on Fall & Injury Prevention, visit: [email or web address]-[email or web address]-[email or web address] OR  [email or web address]-tips-to-avoid-injury OR  https://www.cdc.gov/steadi/patient.html

## 2024-01-12 NOTE — PROGRESS NOTE ADULT - SUBJECTIVE AND OBJECTIVE BOX
INTERVAL HPI/OVERNIGHT EVENTS:  No acute tele events overnight  Patient SP AF Ablation POD#1  HD stable and feeling well    MEDICATIONS  (STANDING):  aMIOdarone    Tablet 200 milliGRAM(s) Oral daily  metoprolol tartrate 50 milliGRAM(s) Oral two times a day  rivaroxaban 20 milliGRAM(s) Oral daily  torsemide 20 milliGRAM(s) Oral daily    MEDICATIONS  (PRN):      Allergies    contrast media (gadolinium-based) (Vomiting; Nausea)    Intolerances        REVIEW OF SYSTEMS: No CP, palpitations, dizziness or SOB    Vital Signs Last 24 Hrs  T(C): 36.5 (12 Jan 2024 09:03), Max: 37.2 (12 Jan 2024 04:01)  T(F): 97.7 (12 Jan 2024 09:03), Max: 99 (12 Jan 2024 04:01)  HR: 57 (12 Jan 2024 09:03) (57 - 84)  BP: 126/71 (12 Jan 2024 09:03) (125/56 - 171/71)  BP(mean): 77 (12 Jan 2024 09:03) (65 - 102)  RR: 18 (12 Jan 2024 05:40) (15 - 18)  SpO2: 95% (12 Jan 2024 05:40) (94% - 98%)    Parameters below as of 12 Jan 2024 05:40  Patient On (Oxygen Delivery Method): room air        01-11-24 @ 07:01  -  01-12-24 @ 07:00  --------------------------------------------------------  IN: 240 mL / OUT: 850 mL / NET: -610 mL        Physical Exam  GENERAL: In no apparent distress, well nourished, and hydrated.  EYES: EOMI, PERRLA, conjunctiva and sclera clear  NECK: Supple  HEART: Regular rate and rhythm; No murmurs, rubs, or gallops.  PULMONARY: Clear to auscultation and perfusion.  No rales, wheezing, or rhonchi bilaterally.  EXTREMITIES:  2+ Peripheral Pulses, No clubbing, cyanosis, or edema  SKIN: Groins healing well BL, no hematoma  NEUROLOGICAL: Grossly nonfocal    LABS:                RADIOLOGY & ADDITIONAL TESTS:

## 2024-01-12 NOTE — PROGRESS NOTE ADULT - ASSESSMENT
58y Female  with PMHx of Htn, hld, obesity, CM with EF 40-45%, longstanding paroxysmal atrial fibrillation, converted to persistent atrial fibrillation in 2022 who presented to [facility] for elective AF Ablation. POD#1 and feeling well    Impression:  Persistent AF sp Ablation  HTN  HLD  HFrEF 40-45%    Plan:  - Continue Xarelto 20mg daily  - Start Pepcid 20mg daily for one month  - Cont all other home medications  - No heavy lifting or squatting for one week  - Follow up with Dr Burleson in one month  58y Female  with PMHx of Htn, hld, obesity, CM with EF 40-45%, longstanding paroxysmal atrial fibrillation, converted to persistent atrial fibrillation in 2022 who presented to [facility] for elective AF Ablation. POD#1 and feeling well    Impression:  Persistent AF sp Ablation  HTN  HLD  HFrEF 40-45%    Plan:  - Continue Xarelto 20mg daily  - Start Pepcid 20mg daily for one month  - Cont all other home medications  - No heavy lifting or squatting for one week  - Follow up with Dr Burleson in one month

## 2024-01-12 NOTE — DISCHARGE NOTE NURSING/CASE MANAGEMENT/SOCIAL WORK - PATIENT PORTAL LINK FT
You can access the FollowMyHealth Patient Portal offered by [facility] by registering at the following website: [email or web address]. By joining [employer]’s FollowMyHealth portal, you will also be able to view your health information using other applications (apps) compatible with our system. You can access the FollowMyHealth Patient Portal offered by [facility] by registering at the following website: [email or web address]. By joining [employer]’s FollowMyHealth portal, you will also be able to view your health information using other applications (apps) compatible with our system.

## 2024-01-14 DIAGNOSIS — I48.92 UNSPECIFIED ATRIAL FLUTTER: ICD-10-CM

## 2024-01-14 DIAGNOSIS — E78.5 HYPERLIPIDEMIA, UNSPECIFIED: ICD-10-CM

## 2024-01-14 DIAGNOSIS — E66.9 OBESITY, UNSPECIFIED: ICD-10-CM

## 2024-01-14 DIAGNOSIS — I48.19 OTHER PERSISTENT ATRIAL FIBRILLATION: ICD-10-CM

## 2024-01-14 DIAGNOSIS — I50.22 CHRONIC SYSTOLIC (CONGESTIVE) HEART FAILURE: ICD-10-CM

## 2024-01-14 DIAGNOSIS — I11.0 HYPERTENSIVE HEART DISEASE WITH HEART FAILURE: ICD-10-CM

## 2024-01-14 DIAGNOSIS — Z87.891 PERSONAL HISTORY OF NICOTINE DEPENDENCE: ICD-10-CM

## 2024-02-26 ENCOUNTER — APPOINTMENT (OUTPATIENT)
Dept: ELECTROPHYSIOLOGY | Facility: CLINIC | Age: 59
End: 2024-02-26
Payer: COMMERCIAL

## 2024-02-26 VITALS
DIASTOLIC BLOOD PRESSURE: 80 MMHG | SYSTOLIC BLOOD PRESSURE: 140 MMHG | WEIGHT: 250 LBS | HEART RATE: 62 BPM | BODY MASS INDEX: 37.03 KG/M2 | TEMPERATURE: 98 F | HEIGHT: 69 IN

## 2024-02-26 DIAGNOSIS — I48.19 OTHER PERSISTENT ATRIAL FIBRILLATION: ICD-10-CM

## 2024-02-26 DIAGNOSIS — I42.9 CARDIOMYOPATHY, UNSPECIFIED: ICD-10-CM

## 2024-02-26 DIAGNOSIS — I10 ESSENTIAL (PRIMARY) HYPERTENSION: ICD-10-CM

## 2024-02-26 PROCEDURE — 93000 ELECTROCARDIOGRAM COMPLETE: CPT

## 2024-02-26 PROCEDURE — 99214 OFFICE O/P EST MOD 30 MIN: CPT | Mod: 25

## 2024-02-26 RX ORDER — METOPROLOL TARTRATE 50 MG/1
50 TABLET, FILM COATED ORAL
Qty: 90 | Refills: 0 | Status: ACTIVE | COMMUNITY

## 2024-02-26 RX ORDER — TORSEMIDE 10 MG/ML
20 INJECTION, SOLUTION INTRAVENOUS DAILY
Refills: 0 | Status: COMPLETED | COMMUNITY
End: 2024-02-26

## 2024-02-28 ENCOUNTER — NON-APPOINTMENT (OUTPATIENT)
Age: 59
End: 2024-02-28

## 2024-03-04 PROBLEM — I48.19 PERSISTENT ATRIAL FIBRILLATION: Status: ACTIVE | Noted: 2023-11-14

## 2024-03-04 PROBLEM — I10 ESSENTIAL HYPERTENSION: Status: ACTIVE | Noted: 2023-11-14

## 2024-03-04 PROBLEM — I42.9 CARDIOMYOPATHY, UNSPECIFIED TYPE: Status: ACTIVE | Noted: 2023-11-14

## 2024-03-04 RX ORDER — AMIODARONE HYDROCHLORIDE 100 MG/1
100 TABLET ORAL
Qty: 45 | Refills: 1 | Status: ACTIVE | COMMUNITY
Start: 2023-11-14 | End: 1900-01-01

## 2024-03-04 RX ORDER — TORSEMIDE 20 MG/1
20 TABLET ORAL DAILY
Qty: 30 | Refills: 0 | Status: ACTIVE | COMMUNITY

## 2024-03-04 NOTE — HISTORY OF PRESENT ILLNESS
[FreeTextEntry1] : Hypertension, hyperlipidemia, obesity with BMI of 36.9, cardiomyopathy with EF 40-45%, longstanding paroxysmal atrial fibrillation, converted to persistent atrial fibrillation in 2022.   Patient had longstanding paroxysmal atrial fibrillation for many years with episodes occurring once or twice a year and self-limited. In June 2022, patient presented to [facility] with persistent atrial fibrillation with RVR and was diagnosed with low ejection fraction.    Patient underwent unsuccessful cardioversion in June 2022, was initiated on Amiodarone, and had a repeat cardioversion on July 19, 2022, which was successful. EF at that time was 40-45%. A few months after cardioversion, Amiodarone was stopped, and patient remained in sinus rhythm until November 2, 2023. On November 2, 2023, patient had severe palpations and was found to be in atrial fibrillation.  She converted spontaneously to sinus rhythm.  11/14/2023: started on Amiodarone 200 mg daily.    1/22/2024: (1/11/2024): RF AF ablation of AF: PVI + PWI + CTI - Amiodarone lowered to 100 mg daily.   2/26/2024: remains in sinus rhythm. She has no chest pain, no shortness of breath, no dyspnea on exertion, no orthopnea, no PND. She denies dizziness, lightheadedness and syncope. She has no exertional symptoms. She presents for evaluation.

## 2024-03-04 NOTE — DISCUSSION/SUMMARY
[FreeTextEntry1] : Ms. Rosaline Thomas is a pleasant 57-year-old woman with hypertension, hyperlipidemia, obesity with BMI of 36.9, cardiomyopathy with EF 40-45%, longstanding paroxysmal atrial fibrillation, persistent atrial fibrillation in June 2022. She was started on Amiodarone 200 mg daily on 11/14/2023. On (1/11/2024): RF AF ablation: Successful radiofrequency ablation of Atrial Fibrillation in the form of pulmonary veins isolation with entrance and exit block. Successful posterior wall isolation with entrance and exit block. Successful radiofrequency ablation of cavotricuspid isthmus dependent flutter. No evidence of inducible atrial fibrillation or atrial flutter. On 1/11/2024 Amiodarone lowered to 100 mg daily.   Her CHADSVASC score is 3 and patient is on Xarelto for stroke prevention. I recommend continuing Xarelto for stroke prevention.      I recommend continuing Metoprolol 50 mg once a day for the treatment and suppression of atrial fibrillation.    I discussed with patient at length lifestyle modifications for the prevention of atrial fibrillation, including weight loss, healthy diet, avoiding alcohol, checking and treating sleep apnea.   I recommend continue Amiodarone 100 mg daily and lowered to 100 qod starting 4/1/2024. I will plan to stop Amiodarone in the first 3-6 months after the ablation.     Patient has no arrhythmias since the catheter ablation. There are no groin hematomas or bleeding. Patient is pleased with results of catheter ablation although is aware with the risk of recurrent symptoms and need for another ablation. Post ablation care was discussed in great length. I discussed with patient contacting me in case of any symptoms suggestive of recurrence.  I discussed with patient plan of care in great details. I answered all her questions to her satisfaction. Patient was pleased with the visit.  Patient will follow with me in 6 months' time. Please do not hesitate to contact me at [phone] if you have any further questions regarding this patient care.  [EKG obtained to assist in diagnosis and management of assessed problem(s)] : EKG obtained to assist in diagnosis and management of assessed problem(s)

## 2024-03-04 NOTE — CARDIOLOGY SUMMARY
[de-identified] : (2/26/2024) ECG: sinus rhythm at 62 bpm, non-sp T wave abnoramlities (11/14/2023): ECG. Sinus rhythm at 59 bpm, poor R wave progression, nonspecific T wave abnormalities.   [de-identified] : (11/2023): 2D echo at Dr. Nascimento's office pending.   (07/19/2022): DIANNE. EF 40-45%. Moderate to severe left atrial enlargement. Mild MR.   (06/20/2023): DIANNE. EF 30-35%. Moderate MR.   (06/19/2022): 2D echo. EF 25-30%.    [de-identified] : (1/11/2024): RF AF ablation: Successful radiofrequency ablation of Atrial Fibrillation in the form of pulmonary veins isolation with entrance and exit block. Successful posterior wall isolation with entrance and exit block. Successful radiofrequency ablation of cavotricuspid isthmus dependent flutter. No evidence of inducible atrial fibrillation or atrial flutter.  (07/19/2022): DC cardioversion successful.   (06/20/2022): DC cardioversion unsuccessful.

## 2024-03-04 NOTE — REASON FOR VISIT
[Arrhythmia/ECG Abnorrmalities] : arrhythmia/ECG abnormalities [FreeTextEntry3] : Dr. Matt Nascimento - Dr. Ricci Roa

## 2024-05-07 ENCOUNTER — APPOINTMENT (OUTPATIENT)
Dept: PULMONOLOGY | Facility: CLINIC | Age: 59
End: 2024-05-07
Payer: COMMERCIAL

## 2024-05-07 VITALS
DIASTOLIC BLOOD PRESSURE: 80 MMHG | HEART RATE: 69 BPM | SYSTOLIC BLOOD PRESSURE: 140 MMHG | BODY MASS INDEX: 37.03 KG/M2 | HEIGHT: 69 IN | WEIGHT: 250 LBS | OXYGEN SATURATION: 97 %

## 2024-05-07 DIAGNOSIS — G47.30 SLEEP APNEA, UNSPECIFIED: ICD-10-CM

## 2024-05-07 DIAGNOSIS — R91.1 SOLITARY PULMONARY NODULE: ICD-10-CM

## 2024-05-07 PROCEDURE — 99213 OFFICE O/P EST LOW 20 MIN: CPT

## 2024-05-07 NOTE — ASSESSMENT
[FreeTextEntry1] : JAY  Snores, witnessed apneas, overweight, CV disease High likelihood  Doesn't want in lab study  Home study ordered   Lung nodule  2mm  Low risk CT findings; Former smoker  Repeat CT ordered   2 months follow up

## 2024-05-07 NOTE — PHYSICAL EXAM
[No Acute Distress] : no acute distress [III] : Mallampati Class: III [Normal Appearance] : normal appearance [No Neck Mass] : no neck mass [No Murmurs] : no murmurs [No Resp Distress] : no resp distress [Clear to Auscultation Bilaterally] : clear to auscultation bilaterally [No Abnormalities] : no abnormalities [Benign] : benign [Normal Gait] : normal gait [No Clubbing] : no clubbing [No Cyanosis] : no cyanosis [No Edema] : no edema [FROM] : FROM [Normal Color/ Pigmentation] : normal color/ pigmentation [No Focal Deficits] : no focal deficits [Oriented x3] : oriented x3 [Normal Affect] : normal affect [TextBox_54] : Irregular [TextBox_11] : LArge neck

## 2024-05-07 NOTE — HISTORY OF PRESENT ILLNESS
[TextBox_4] : 56-year-old female who has a history of heart failure with reduced ejection fraction, atrial fibrillation on anticoagulation with Xarelto, recently admitted to the hospital for CHF exacerbation and atrial fibrillation.  She underwent cardioversion twice without success.  She is scheduled for cardioversion tomorrow with her cardiologist.  She has dyspnea on exertion.  She has no cough or phlegm production.  From a sleep standpoint she is known to snore at night, has no witnessed apnea, but has a large neck and Mallampati 3 on physical examination.    10/17/2022: Sleep study is still pending.  Low-dose CT of the chest reviewed with a 2 mm subpleural right upper lobe lung nodule.  Recommend follow-up in 1 year.  Her CHF symptoms are well under control and she is on p.o. diuretics as needed.  She is also on Xarelto for atrial fibrillation.  Follow-up in 3 months after the sleep study is done.  5/7/24: CT chest and Sleep study not done. No new issues. Will order today.

## 2024-05-14 ENCOUNTER — OUTPATIENT (OUTPATIENT)
Dept: OUTPATIENT SERVICES | Facility: HOSPITAL | Age: 59
LOS: 1 days | Discharge: HOME | End: 2024-05-14
Payer: COMMERCIAL

## 2024-05-14 ENCOUNTER — APPOINTMENT (OUTPATIENT)
Dept: SLEEP CENTER | Facility: HOSPITAL | Age: 59
End: 2024-05-14
Payer: COMMERCIAL

## 2024-05-14 DIAGNOSIS — G47.33 OBSTRUCTIVE SLEEP APNEA (ADULT) (PEDIATRIC): ICD-10-CM

## 2024-05-14 DIAGNOSIS — Z98.890 OTHER SPECIFIED POSTPROCEDURAL STATES: Chronic | ICD-10-CM

## 2024-05-14 DIAGNOSIS — Z98.891 HISTORY OF UTERINE SCAR FROM PREVIOUS SURGERY: Chronic | ICD-10-CM

## 2024-05-14 PROCEDURE — 95800 SLP STDY UNATTENDED: CPT | Mod: 26

## 2024-05-14 PROCEDURE — 95800 SLP STDY UNATTENDED: CPT

## 2024-05-16 DIAGNOSIS — G47.33 OBSTRUCTIVE SLEEP APNEA (ADULT) (PEDIATRIC): ICD-10-CM

## 2024-06-15 ENCOUNTER — RESULT REVIEW (OUTPATIENT)
Age: 59
End: 2024-06-15

## 2024-06-15 ENCOUNTER — OUTPATIENT (OUTPATIENT)
Dept: OUTPATIENT SERVICES | Facility: HOSPITAL | Age: 59
LOS: 1 days | End: 2024-06-15
Payer: COMMERCIAL

## 2024-06-15 DIAGNOSIS — Z00.8 ENCOUNTER FOR OTHER GENERAL EXAMINATION: ICD-10-CM

## 2024-06-15 DIAGNOSIS — Z98.890 OTHER SPECIFIED POSTPROCEDURAL STATES: Chronic | ICD-10-CM

## 2024-06-15 DIAGNOSIS — R91.1 SOLITARY PULMONARY NODULE: ICD-10-CM

## 2024-06-15 DIAGNOSIS — Z98.891 HISTORY OF UTERINE SCAR FROM PREVIOUS SURGERY: Chronic | ICD-10-CM

## 2024-06-15 PROCEDURE — 71250 CT THORAX DX C-: CPT | Mod: 26

## 2024-06-15 PROCEDURE — 71250 CT THORAX DX C-: CPT

## 2024-06-16 DIAGNOSIS — R91.1 SOLITARY PULMONARY NODULE: ICD-10-CM

## 2024-06-18 ENCOUNTER — APPOINTMENT (OUTPATIENT)
Dept: PULMONOLOGY | Facility: CLINIC | Age: 59
End: 2024-06-18

## 2024-08-26 ENCOUNTER — APPOINTMENT (OUTPATIENT)
Dept: ELECTROPHYSIOLOGY | Facility: CLINIC | Age: 59
End: 2024-08-26
Payer: COMMERCIAL

## 2024-08-26 VITALS
HEIGHT: 68 IN | TEMPERATURE: 97.1 F | WEIGHT: 250 LBS | BODY MASS INDEX: 37.89 KG/M2 | HEART RATE: 58 BPM | DIASTOLIC BLOOD PRESSURE: 81 MMHG | SYSTOLIC BLOOD PRESSURE: 150 MMHG

## 2024-08-26 DIAGNOSIS — I10 ESSENTIAL (PRIMARY) HYPERTENSION: ICD-10-CM

## 2024-08-26 DIAGNOSIS — I48.19 OTHER PERSISTENT ATRIAL FIBRILLATION: ICD-10-CM

## 2024-08-26 PROCEDURE — 99214 OFFICE O/P EST MOD 30 MIN: CPT | Mod: 25

## 2024-08-26 PROCEDURE — 93000 ELECTROCARDIOGRAM COMPLETE: CPT

## 2024-08-31 NOTE — DISCUSSION/SUMMARY
[FreeTextEntry1] : Ms. Rosaline Thomas is a pleasant 58-year-old woman with hypertension, hyperlipidemia, obesity with BMI of 36.9, cardiomyopathy with EF 40-45%, longstanding paroxysmal atrial fibrillation, persistent atrial fibrillation in June 2022. She was started on Amiodarone 200 mg daily on 11/14/2023. On (1/11/2024): RF AF ablation: Successful radiofrequency ablation of Atrial Fibrillation in the form of pulmonary veins isolation with entrance and exit block. Successful posterior wall isolation with entrance and exit block. Successful radiofrequency ablation of cavotricuspid isthmus dependent flutter. No evidence of inducible atrial fibrillation or atrial flutter. On 1/11/2024 Amiodarone lowered to 100 mg daily, and to 100 q48h on 4/1/2024. I recommend to stop Amiodarone starting 8/26/2024.   Her CHADSVASC score is 3 and patient is on Xarelto for stroke prevention. I recommend continuing Xarelto for stroke prevention.      I recommend continuing Metoprolol 50 mg once a day for the treatment and suppression of atrial fibrillation.    I discussed with patient at length lifestyle modifications for the prevention of atrial fibrillation, including weight loss, healthy diet, avoiding alcohol, checking and treating sleep apnea. I recommend patient follows with Dr. Burton for management of JAY and with PCP for interventions for weight loss.  Patient has no arrhythmias since the catheter ablation. There are no groin hematomas or bleeding. Patient is pleased with results of catheter ablation although is aware with the risk of recurrent symptoms and need for another ablation. Post ablation care was discussed in great length. I discussed with patient contacting me in case of any symptoms suggestive of recurrence.  I discussed with patient plan of care in great details. I answered all her questions to her satisfaction. Patient was pleased with the visit.  Patient will follow with me in 6 months' time. Please do not hesitate to contact me at [phone] if you have any further questions regarding this patient care.  [EKG obtained to assist in diagnosis and management of assessed problem(s)] : EKG obtained to assist in diagnosis and management of assessed problem(s)

## 2024-08-31 NOTE — CARDIOLOGY SUMMARY
[de-identified] : (8/26/2024) ECG: sinus rhythm at 58 bpm, non-sp T wave abnormalities (2/26/2024) ECG: sinus rhythm at 62 bpm, non-sp T wave abnormalities (11/14/2023): ECG. Sinus rhythm at 59 bpm, poor R wave progression, nonspecific T wave abnormalities.   [de-identified] : (11/2023): 2D echo at Dr. Nascimento's office pending.   (07/19/2022): DIANNE. EF 40-45%. Moderate to severe left atrial enlargement. Mild MR.   (06/20/2023): DIANNE. EF 30-35%. Moderate MR.   (06/19/2022): 2D echo. EF 25-30%.    [de-identified] : (1/11/2024): RF AF ablation: Successful radiofrequency ablation of Atrial Fibrillation in the form of pulmonary veins isolation with entrance and exit block. Successful posterior wall isolation with entrance and exit block. Successful radiofrequency ablation of cavotricuspid isthmus dependent flutter. No evidence of inducible atrial fibrillation or atrial flutter.  (07/19/2022): DC cardioversion successful.   (06/20/2022): DC cardioversion unsuccessful.

## 2024-08-31 NOTE — HISTORY OF PRESENT ILLNESS
[FreeTextEntry1] : Hypertension, hyperlipidemia, obesity with BMI of 36.9, cardiomyopathy with EF 40-45%, longstanding paroxysmal atrial fibrillation, converted to persistent atrial fibrillation in 2022.   Patient had longstanding paroxysmal atrial fibrillation for many years with episodes occurring once or twice a year and self-limited. In June 2022, patient presented to [facility] with persistent atrial fibrillation with RVR and was diagnosed with low ejection fraction.    Patient underwent unsuccessful cardioversion in June 2022, was initiated on Amiodarone, and had a repeat cardioversion on July 19, 2022, which was successful. EF at that time was 40-45%. A few months after cardioversion, Amiodarone was stopped, and patient remained in sinus rhythm until November 2, 2023. On November 2, 2023, patient had severe palpations and was found to be in atrial fibrillation.  She converted spontaneously to sinus rhythm.  11/14/2023: started on Amiodarone 200 mg daily.    1/22/2024: (1/11/2024): RF AF ablation of AF: PVI + PWI + CTI - Amiodarone lowered to 100 mg daily.   2/26/2024: remains in sinus rhythm.   8/26/2024: on Amio 100 q48h. in sinus rhythm. no weight loss in last 6 months. She has no chest pain, no shortness of breath, no dyspnea on exertion, no orthopnea, no PND. She denies dizziness, lightheadedness and syncope. She has no exertional symptoms. She presents for evaluation.

## 2024-09-03 ENCOUNTER — APPOINTMENT (OUTPATIENT)
Dept: PULMONOLOGY | Facility: CLINIC | Age: 59
End: 2024-09-03
Payer: COMMERCIAL

## 2024-09-03 VITALS
WEIGHT: 250 LBS | HEART RATE: 72 BPM | OXYGEN SATURATION: 98 % | HEIGHT: 68 IN | SYSTOLIC BLOOD PRESSURE: 128 MMHG | DIASTOLIC BLOOD PRESSURE: 62 MMHG | BODY MASS INDEX: 37.89 KG/M2

## 2024-09-03 DIAGNOSIS — G47.30 SLEEP APNEA, UNSPECIFIED: ICD-10-CM

## 2024-09-03 DIAGNOSIS — R91.1 SOLITARY PULMONARY NODULE: ICD-10-CM

## 2024-09-03 PROCEDURE — 99213 OFFICE O/P EST LOW 20 MIN: CPT

## 2024-09-03 NOTE — HISTORY OF PRESENT ILLNESS
[TextBox_4] : 56-year-old female who has a history of heart failure with reduced ejection fraction, atrial fibrillation on anticoagulation with Xarelto, recently admitted to the hospital for CHF exacerbation and atrial fibrillation.  She underwent cardioversion twice without success.  She is scheduled for cardioversion tomorrow with her cardiologist.  She has dyspnea on exertion.  She has no cough or phlegm production.  From a sleep standpoint she is known to snore at night, has no witnessed apnea, but has a large neck and Mallampati 3 on physical examination.     9/3/24: Sleep study done with severe JAY; AHI 45. CT chest with stable nodules since 2022.

## 2024-09-03 NOTE — ASSESSMENT
[FreeTextEntry1] : JAY  Severe on home study  APAP 6-18 ordered  Nasal mask  2 months follow up for compliance  Lung nodule stable since 2022 CT in 1 year - low risk findings    3 months follow up

## 2024-11-13 ENCOUNTER — APPOINTMENT (OUTPATIENT)
Dept: PULMONOLOGY | Facility: CLINIC | Age: 59
End: 2024-11-13
Payer: COMMERCIAL

## 2024-11-13 VITALS
BODY MASS INDEX: 38.01 KG/M2 | OXYGEN SATURATION: 96 % | HEART RATE: 81 BPM | RESPIRATION RATE: 14 BRPM | DIASTOLIC BLOOD PRESSURE: 68 MMHG | WEIGHT: 250 LBS | SYSTOLIC BLOOD PRESSURE: 137 MMHG

## 2024-11-13 DIAGNOSIS — G47.30 SLEEP APNEA, UNSPECIFIED: ICD-10-CM

## 2024-11-13 DIAGNOSIS — R91.1 SOLITARY PULMONARY NODULE: ICD-10-CM

## 2024-11-13 PROCEDURE — 99213 OFFICE O/P EST LOW 20 MIN: CPT

## 2025-01-23 ENCOUNTER — RESULT REVIEW (OUTPATIENT)
Age: 60
End: 2025-01-23

## 2025-01-23 ENCOUNTER — OUTPATIENT (OUTPATIENT)
Dept: OUTPATIENT SERVICES | Facility: HOSPITAL | Age: 60
LOS: 1 days | End: 2025-01-23
Payer: COMMERCIAL

## 2025-01-23 DIAGNOSIS — Z98.890 OTHER SPECIFIED POSTPROCEDURAL STATES: Chronic | ICD-10-CM

## 2025-01-23 DIAGNOSIS — Z98.891 HISTORY OF UTERINE SCAR FROM PREVIOUS SURGERY: Chronic | ICD-10-CM

## 2025-01-23 DIAGNOSIS — Z00.8 ENCOUNTER FOR OTHER GENERAL EXAMINATION: ICD-10-CM

## 2025-01-23 DIAGNOSIS — R93.1 ABNORMAL FINDINGS ON DIAGNOSTIC IMAGING OF HEART AND CORONARY CIRCULATION: ICD-10-CM

## 2025-01-23 PROCEDURE — 75574 CT ANGIO HRT W/3D IMAGE: CPT

## 2025-01-23 PROCEDURE — 75574 CT ANGIO HRT W/3D IMAGE: CPT | Mod: 26

## 2025-01-24 DIAGNOSIS — R93.1 ABNORMAL FINDINGS ON DIAGNOSTIC IMAGING OF HEART AND CORONARY CIRCULATION: ICD-10-CM

## 2025-01-27 ENCOUNTER — TRANSCRIPTION ENCOUNTER (OUTPATIENT)
Age: 60
End: 2025-01-27

## 2025-02-03 ENCOUNTER — OUTPATIENT (OUTPATIENT)
Dept: OUTPATIENT SERVICES | Facility: HOSPITAL | Age: 60
LOS: 1 days | End: 2025-02-03
Payer: COMMERCIAL

## 2025-02-03 ENCOUNTER — RESULT REVIEW (OUTPATIENT)
Age: 60
End: 2025-02-03

## 2025-02-03 DIAGNOSIS — Z98.891 HISTORY OF UTERINE SCAR FROM PREVIOUS SURGERY: Chronic | ICD-10-CM

## 2025-02-03 DIAGNOSIS — Z98.890 OTHER SPECIFIED POSTPROCEDURAL STATES: Chronic | ICD-10-CM

## 2025-02-03 DIAGNOSIS — R93.1 ABNORMAL FINDINGS ON DIAGNOSTIC IMAGING OF HEART AND CORONARY CIRCULATION: ICD-10-CM

## 2025-02-03 PROCEDURE — 75580 N-INVAS EST C FFR SW ALY CTA: CPT | Mod: 26

## 2025-02-03 PROCEDURE — 75580 N-INVAS EST C FFR SW ALY CTA: CPT

## 2025-02-04 DIAGNOSIS — R93.1 ABNORMAL FINDINGS ON DIAGNOSTIC IMAGING OF HEART AND CORONARY CIRCULATION: ICD-10-CM

## 2025-02-24 ENCOUNTER — APPOINTMENT (OUTPATIENT)
Dept: ELECTROPHYSIOLOGY | Facility: CLINIC | Age: 60
End: 2025-02-24
Payer: COMMERCIAL

## 2025-02-24 ENCOUNTER — NON-APPOINTMENT (OUTPATIENT)
Age: 60
End: 2025-02-24

## 2025-02-24 VITALS
BODY MASS INDEX: 36.37 KG/M2 | HEIGHT: 68 IN | HEART RATE: 66 BPM | WEIGHT: 240 LBS | SYSTOLIC BLOOD PRESSURE: 120 MMHG | DIASTOLIC BLOOD PRESSURE: 72 MMHG

## 2025-02-24 DIAGNOSIS — I10 ESSENTIAL (PRIMARY) HYPERTENSION: ICD-10-CM

## 2025-02-24 DIAGNOSIS — I42.9 CARDIOMYOPATHY, UNSPECIFIED: ICD-10-CM

## 2025-02-24 DIAGNOSIS — I48.19 OTHER PERSISTENT ATRIAL FIBRILLATION: ICD-10-CM

## 2025-02-24 PROCEDURE — 93000 ELECTROCARDIOGRAM COMPLETE: CPT

## 2025-02-24 PROCEDURE — 99214 OFFICE O/P EST MOD 30 MIN: CPT | Mod: 25

## 2025-02-24 RX ORDER — METFORMIN HYDROCHLORIDE 500 MG/1
500 TABLET, COATED ORAL DAILY
Refills: 0 | Status: ACTIVE | COMMUNITY

## 2025-02-24 RX ORDER — SIMVASTATIN 5 MG/1
5 TABLET, FILM COATED ORAL DAILY
Refills: 0 | Status: ACTIVE | COMMUNITY

## 2025-02-25 ENCOUNTER — APPOINTMENT (OUTPATIENT)
Dept: PULMONOLOGY | Facility: CLINIC | Age: 60
End: 2025-02-25